# Patient Record
Sex: FEMALE | Race: WHITE | Employment: OTHER | URBAN - METROPOLITAN AREA
[De-identification: names, ages, dates, MRNs, and addresses within clinical notes are randomized per-mention and may not be internally consistent; named-entity substitution may affect disease eponyms.]

---

## 2017-01-18 ENCOUNTER — OFFICE VISIT (OUTPATIENT)
Dept: FAMILY MEDICINE CLINIC | Age: 82
End: 2017-01-18

## 2017-01-18 VITALS
HEART RATE: 64 BPM | SYSTOLIC BLOOD PRESSURE: 112 MMHG | RESPIRATION RATE: 10 BRPM | BODY MASS INDEX: 25.8 KG/M2 | HEIGHT: 64 IN | DIASTOLIC BLOOD PRESSURE: 62 MMHG | WEIGHT: 151.13 LBS

## 2017-01-18 DIAGNOSIS — M19.90 OSTEOARTHRITIS, UNSPECIFIED OSTEOARTHRITIS TYPE, UNSPECIFIED SITE: ICD-10-CM

## 2017-01-18 DIAGNOSIS — I73.9 PERIPHERAL VASCULAR DISEASE, UNSPECIFIED (HCC): ICD-10-CM

## 2017-01-18 DIAGNOSIS — R60.9 PERIPHERAL EDEMA: ICD-10-CM

## 2017-01-18 DIAGNOSIS — F41.9 ANXIETY: ICD-10-CM

## 2017-01-18 DIAGNOSIS — G62.9 NEUROPATHY: Primary | ICD-10-CM

## 2017-01-18 DIAGNOSIS — R26.81 GAIT INSTABILITY: ICD-10-CM

## 2017-01-18 PROCEDURE — 99213 OFFICE O/P EST LOW 20 MIN: CPT | Performed by: FAMILY MEDICINE

## 2017-01-18 ASSESSMENT — ENCOUNTER SYMPTOMS
CONSTIPATION: 0
EYE PAIN: 0
SORE THROAT: 0
COUGH: 0
WHEEZING: 0
SHORTNESS OF BREATH: 0
NAUSEA: 0
ABDOMINAL PAIN: 0
CHEST TIGHTNESS: 0

## 2017-01-24 LAB
BUN BLDV-MCNC: 14 MG/DL
CALCIUM SERPL-MCNC: 8.8 MG/DL
CHLORIDE BLD-SCNC: 103 MMOL/L
CO2: 26 MMOL/L
CREAT SERPL-MCNC: 1.07 MG/DL
GFR CALCULATED: 44
GLUCOSE BLD-MCNC: 92 MG/DL
POTASSIUM SERPL-SCNC: 3.9 MMOL/L
SODIUM BLD-SCNC: 142 MMOL/L

## 2017-01-25 ENCOUNTER — OFFICE VISIT (OUTPATIENT)
Dept: NEPHROLOGY | Age: 82
End: 2017-01-25

## 2017-01-25 VITALS
WEIGHT: 153 LBS | BODY MASS INDEX: 26.26 KG/M2 | OXYGEN SATURATION: 98 % | HEART RATE: 83 BPM | SYSTOLIC BLOOD PRESSURE: 120 MMHG | DIASTOLIC BLOOD PRESSURE: 76 MMHG

## 2017-01-25 DIAGNOSIS — R60.9 PERIPHERAL EDEMA: ICD-10-CM

## 2017-01-25 DIAGNOSIS — N18.30 CKD (CHRONIC KIDNEY DISEASE), STAGE III (HCC): Primary | ICD-10-CM

## 2017-01-25 PROCEDURE — G8427 DOCREV CUR MEDS BY ELIG CLIN: HCPCS | Performed by: INTERNAL MEDICINE

## 2017-01-25 PROCEDURE — G8484 FLU IMMUNIZE NO ADMIN: HCPCS | Performed by: INTERNAL MEDICINE

## 2017-01-25 PROCEDURE — 1090F PRES/ABSN URINE INCON ASSESS: CPT | Performed by: INTERNAL MEDICINE

## 2017-01-25 PROCEDURE — 1123F ACP DISCUSS/DSCN MKR DOCD: CPT | Performed by: INTERNAL MEDICINE

## 2017-01-25 PROCEDURE — 4040F PNEUMOC VAC/ADMIN/RCVD: CPT | Performed by: INTERNAL MEDICINE

## 2017-01-25 PROCEDURE — 99213 OFFICE O/P EST LOW 20 MIN: CPT | Performed by: INTERNAL MEDICINE

## 2017-01-25 PROCEDURE — G8420 CALC BMI NORM PARAMETERS: HCPCS | Performed by: INTERNAL MEDICINE

## 2017-01-25 PROCEDURE — 1036F TOBACCO NON-USER: CPT | Performed by: INTERNAL MEDICINE

## 2017-02-13 ENCOUNTER — TELEPHONE (OUTPATIENT)
Dept: FAMILY MEDICINE CLINIC | Age: 82
End: 2017-02-13

## 2017-04-25 ENCOUNTER — OFFICE VISIT (OUTPATIENT)
Dept: FAMILY MEDICINE CLINIC | Age: 82
End: 2017-04-25

## 2017-04-25 VITALS
HEIGHT: 64 IN | RESPIRATION RATE: 14 BRPM | SYSTOLIC BLOOD PRESSURE: 116 MMHG | DIASTOLIC BLOOD PRESSURE: 64 MMHG | WEIGHT: 154.13 LBS | BODY MASS INDEX: 26.31 KG/M2 | HEART RATE: 72 BPM

## 2017-04-25 DIAGNOSIS — M19.90 OSTEOARTHRITIS, UNSPECIFIED OSTEOARTHRITIS TYPE, UNSPECIFIED SITE: Primary | ICD-10-CM

## 2017-04-25 DIAGNOSIS — K59.01 SLOW TRANSIT CONSTIPATION: ICD-10-CM

## 2017-04-25 DIAGNOSIS — E03.9 HYPOTHYROIDISM (ACQUIRED): ICD-10-CM

## 2017-04-25 DIAGNOSIS — G62.9 NEUROPATHY: ICD-10-CM

## 2017-04-25 DIAGNOSIS — I73.9 PERIPHERAL VASCULAR DISEASE (HCC): ICD-10-CM

## 2017-04-25 DIAGNOSIS — K43.9 VENTRAL HERNIA WITHOUT OBSTRUCTION OR GANGRENE: ICD-10-CM

## 2017-04-25 DIAGNOSIS — I87.8 VENOUS STASIS OF LOWER EXTREMITY: ICD-10-CM

## 2017-04-25 DIAGNOSIS — K21.9 GASTROESOPHAGEAL REFLUX DISEASE, ESOPHAGITIS PRESENCE NOT SPECIFIED: ICD-10-CM

## 2017-04-25 PROCEDURE — 99214 OFFICE O/P EST MOD 30 MIN: CPT | Performed by: FAMILY MEDICINE

## 2017-04-25 RX ORDER — ACETAMINOPHEN 325 MG/1
650 TABLET ORAL EVERY 6 HOURS PRN
COMMUNITY

## 2017-04-25 ASSESSMENT — ENCOUNTER SYMPTOMS
SHORTNESS OF BREATH: 0
WHEEZING: 0
HEARTBURN: 0
COUGH: 0
CONSTIPATION: 0
HOARSE VOICE: 0
ABDOMINAL PAIN: 0
NAUSEA: 0
SORE THROAT: 1
EYE PAIN: 0
CHEST TIGHTNESS: 0

## 2017-05-26 ENCOUNTER — TELEPHONE (OUTPATIENT)
Dept: FAMILY MEDICINE CLINIC | Age: 82
End: 2017-05-26

## 2017-06-09 ENCOUNTER — OFFICE VISIT (OUTPATIENT)
Dept: FAMILY MEDICINE CLINIC | Age: 82
End: 2017-06-09

## 2017-06-09 VITALS
RESPIRATION RATE: 14 BRPM | WEIGHT: 155.25 LBS | HEART RATE: 72 BPM | BODY MASS INDEX: 26.5 KG/M2 | SYSTOLIC BLOOD PRESSURE: 126 MMHG | HEIGHT: 64 IN | DIASTOLIC BLOOD PRESSURE: 70 MMHG

## 2017-06-09 DIAGNOSIS — M54.50 ACUTE MIDLINE LOW BACK PAIN WITHOUT SCIATICA: Primary | ICD-10-CM

## 2017-06-09 DIAGNOSIS — I87.8 VENOUS STASIS OF LOWER EXTREMITY: ICD-10-CM

## 2017-06-09 DIAGNOSIS — M25.552 LEFT HIP PAIN: ICD-10-CM

## 2017-06-09 PROCEDURE — 99213 OFFICE O/P EST LOW 20 MIN: CPT | Performed by: FAMILY MEDICINE

## 2017-06-09 ASSESSMENT — ENCOUNTER SYMPTOMS
CHEST TIGHTNESS: 0
SHORTNESS OF BREATH: 0
NAUSEA: 0
ABDOMINAL PAIN: 0
WHEEZING: 0
CONSTIPATION: 0
SORE THROAT: 0
EYE PAIN: 0
COUGH: 0

## 2017-06-09 ASSESSMENT — PATIENT HEALTH QUESTIONNAIRE - PHQ9
1. LITTLE INTEREST OR PLEASURE IN DOING THINGS: 0
SUM OF ALL RESPONSES TO PHQ QUESTIONS 1-9: 0
2. FEELING DOWN, DEPRESSED OR HOPELESS: 0
SUM OF ALL RESPONSES TO PHQ9 QUESTIONS 1 & 2: 0

## 2017-06-12 ENCOUNTER — TELEPHONE (OUTPATIENT)
Dept: FAMILY MEDICINE CLINIC | Age: 82
End: 2017-06-12

## 2017-06-29 ENCOUNTER — OFFICE VISIT (OUTPATIENT)
Dept: AUDIOLOGY | Age: 82
End: 2017-06-29

## 2017-06-29 DIAGNOSIS — H90.3 SENSORINEURAL HEARING LOSS, BILATERAL: Primary | ICD-10-CM

## 2017-07-11 ENCOUNTER — TELEPHONE (OUTPATIENT)
Dept: AUDIOLOGY | Age: 82
End: 2017-07-11

## 2017-07-24 LAB
BUN BLDV-MCNC: 18 MG/DL
CALCIUM SERPL-MCNC: 8.5 MG/DL
CHLORIDE BLD-SCNC: 104 MMOL/L
CO2: 28 MMOL/L
CREAT SERPL-MCNC: 0.9 MG/DL
GFR CALCULATED: 58
GLUCOSE BLD-MCNC: 90 MG/DL
POTASSIUM SERPL-SCNC: 3.9 MMOL/L
SODIUM BLD-SCNC: 140 MMOL/L

## 2017-07-26 ENCOUNTER — OFFICE VISIT (OUTPATIENT)
Dept: NEPHROLOGY | Age: 82
End: 2017-07-26
Payer: MEDICARE

## 2017-07-26 VITALS
DIASTOLIC BLOOD PRESSURE: 78 MMHG | HEART RATE: 74 BPM | BODY MASS INDEX: 26.69 KG/M2 | SYSTOLIC BLOOD PRESSURE: 128 MMHG | OXYGEN SATURATION: 96 % | WEIGHT: 155.5 LBS

## 2017-07-26 DIAGNOSIS — N18.30 CKD (CHRONIC KIDNEY DISEASE), STAGE III (HCC): ICD-10-CM

## 2017-07-26 DIAGNOSIS — R60.9 PERIPHERAL EDEMA: Primary | ICD-10-CM

## 2017-07-26 PROCEDURE — 4040F PNEUMOC VAC/ADMIN/RCVD: CPT | Performed by: INTERNAL MEDICINE

## 2017-07-26 PROCEDURE — G8427 DOCREV CUR MEDS BY ELIG CLIN: HCPCS | Performed by: INTERNAL MEDICINE

## 2017-07-26 PROCEDURE — 1036F TOBACCO NON-USER: CPT | Performed by: INTERNAL MEDICINE

## 2017-07-26 PROCEDURE — 1090F PRES/ABSN URINE INCON ASSESS: CPT | Performed by: INTERNAL MEDICINE

## 2017-07-26 PROCEDURE — 1123F ACP DISCUSS/DSCN MKR DOCD: CPT | Performed by: INTERNAL MEDICINE

## 2017-07-26 PROCEDURE — 99213 OFFICE O/P EST LOW 20 MIN: CPT | Performed by: INTERNAL MEDICINE

## 2017-07-26 PROCEDURE — G8419 CALC BMI OUT NRM PARAM NOF/U: HCPCS | Performed by: INTERNAL MEDICINE

## 2017-08-08 ENCOUNTER — TELEPHONE (OUTPATIENT)
Dept: FAMILY MEDICINE CLINIC | Age: 82
End: 2017-08-08

## 2017-08-08 DIAGNOSIS — M79.671 FOOT PAIN, BILATERAL: Primary | ICD-10-CM

## 2017-08-08 DIAGNOSIS — M79.672 FOOT PAIN, BILATERAL: Primary | ICD-10-CM

## 2017-08-22 ENCOUNTER — TELEPHONE (OUTPATIENT)
Dept: FAMILY MEDICINE CLINIC | Age: 82
End: 2017-08-22

## 2017-08-24 DIAGNOSIS — R60.9 PERIPHERAL EDEMA: ICD-10-CM

## 2017-08-24 RX ORDER — BUMETANIDE 1 MG/1
TABLET ORAL
Qty: 60 TABLET | Refills: 3 | Status: CANCELLED | OUTPATIENT
Start: 2017-08-24

## 2017-08-25 RX ORDER — BUMETANIDE 1 MG/1
1 TABLET ORAL 2 TIMES DAILY
Qty: 60 TABLET | Refills: 3 | Status: SHIPPED | OUTPATIENT
Start: 2017-08-25 | End: 2017-12-08 | Stop reason: SDUPTHER

## 2017-09-19 RX ORDER — PANTOPRAZOLE SODIUM 40 MG/1
TABLET, DELAYED RELEASE ORAL
Qty: 30 TABLET | Refills: 5 | Status: SHIPPED | OUTPATIENT
Start: 2017-09-19

## 2017-09-19 RX ORDER — POTASSIUM CHLORIDE 750 MG/1
TABLET, FILM COATED, EXTENDED RELEASE ORAL
Qty: 60 TABLET | Refills: 5 | Status: SHIPPED | OUTPATIENT
Start: 2017-09-19 | End: 2018-01-31 | Stop reason: HOSPADM

## 2017-09-19 RX ORDER — DOCUSATE SODIUM 100 MG/1
CAPSULE, LIQUID FILLED ORAL
Qty: 60 CAPSULE | Refills: 5 | Status: SHIPPED | OUTPATIENT
Start: 2017-09-19 | End: 2018-01-31 | Stop reason: HOSPADM

## 2017-09-20 DIAGNOSIS — G62.9 NEUROPATHY: ICD-10-CM

## 2017-09-21 RX ORDER — GABAPENTIN 100 MG/1
CAPSULE ORAL
Qty: 60 CAPSULE | Refills: 5 | Status: SHIPPED | OUTPATIENT
Start: 2017-09-21

## 2017-11-27 ENCOUNTER — APPOINTMENT (OUTPATIENT)
Dept: GENERAL RADIOLOGY | Age: 82
End: 2017-11-27
Payer: MEDICARE

## 2017-11-27 ENCOUNTER — CARE COORDINATOR VISIT (OUTPATIENT)
Dept: CASE MANAGEMENT | Age: 82
End: 2017-11-27

## 2017-11-27 ENCOUNTER — HOSPITAL ENCOUNTER (EMERGENCY)
Age: 82
Discharge: HOME | End: 2017-11-27
Payer: MEDICARE

## 2017-11-27 VITALS
DIASTOLIC BLOOD PRESSURE: 58 MMHG | WEIGHT: 150 LBS | TEMPERATURE: 98 F | RESPIRATION RATE: 18 BRPM | BODY MASS INDEX: 25.75 KG/M2 | SYSTOLIC BLOOD PRESSURE: 116 MMHG | HEART RATE: 68 BPM | OXYGEN SATURATION: 98 %

## 2017-11-27 DIAGNOSIS — M41.9 SCOLIOSIS, UNSPECIFIED SCOLIOSIS TYPE, UNSPECIFIED SPINAL REGION: Primary | ICD-10-CM

## 2017-11-27 DIAGNOSIS — M54.6 ACUTE MIDLINE THORACIC BACK PAIN: ICD-10-CM

## 2017-11-27 DIAGNOSIS — R14.0 GASSINESS: ICD-10-CM

## 2017-11-27 LAB
ALBUMIN SERPL-MCNC: 3.2 G/DL (ref 3.5–5.1)
ALP BLD-CCNC: 130 U/L (ref 38–126)
ALT SERPL-CCNC: 10 U/L (ref 11–66)
ANION GAP SERPL CALCULATED.3IONS-SCNC: 11 MEQ/L (ref 8–16)
ANISOCYTOSIS: ABNORMAL
AST SERPL-CCNC: 16 U/L (ref 5–40)
BACTERIA: NORMAL /HPF
BASOPHILS # BLD: 0.1 %
BASOPHILS ABSOLUTE: 0 THOU/MM3 (ref 0–0.1)
BILIRUB SERPL-MCNC: 0.9 MG/DL (ref 0.3–1.2)
BILIRUBIN DIRECT: < 0.2 MG/DL (ref 0–0.3)
BILIRUBIN URINE: NEGATIVE
BLOOD, URINE: NEGATIVE
BUN BLDV-MCNC: 15 MG/DL (ref 7–22)
CALCIUM SERPL-MCNC: 8.1 MG/DL (ref 8.5–10.5)
CASTS 2: NORMAL /LPF
CASTS UA: NORMAL /LPF
CHARACTER, URINE: CLEAR
CHLORIDE BLD-SCNC: 103 MEQ/L (ref 98–111)
CO2: 28 MEQ/L (ref 23–33)
COLOR: YELLOW
CREAT SERPL-MCNC: 0.9 MG/DL (ref 0.4–1.2)
CRYSTALS, UA: NORMAL
EKG ATRIAL RATE: 59 BPM
EKG P AXIS: 67 DEGREES
EKG P-R INTERVAL: 196 MS
EKG Q-T INTERVAL: 418 MS
EKG QRS DURATION: 100 MS
EKG QTC CALCULATION (BAZETT): 413 MS
EKG R AXIS: -34 DEGREES
EKG T AXIS: 23 DEGREES
EKG VENTRICULAR RATE: 59 BPM
EOSINOPHIL # BLD: 0.6 %
EOSINOPHILS ABSOLUTE: 0 THOU/MM3 (ref 0–0.4)
EPITHELIAL CELLS, UA: NORMAL /HPF
GFR SERPL CREATININE-BSD FRML MDRD: 58 ML/MIN/1.73M2
GLUCOSE BLD-MCNC: 101 MG/DL (ref 70–108)
GLUCOSE URINE: NEGATIVE MG/DL
HCT VFR BLD CALC: 31.9 % (ref 37–47)
HEMOGLOBIN: 10.5 GM/DL (ref 12–16)
KETONES, URINE: NEGATIVE
LEUKOCYTE ESTERASE, URINE: NEGATIVE
LIPASE: 23.7 U/L (ref 5.6–51.3)
LYMPHOCYTES # BLD: 11.9 %
LYMPHOCYTES ABSOLUTE: 0.7 THOU/MM3 (ref 1–4.8)
MCH RBC QN AUTO: 31.3 PG (ref 27–31)
MCHC RBC AUTO-ENTMCNC: 32.9 GM/DL (ref 33–37)
MCV RBC AUTO: 95 FL (ref 81–99)
MISCELLANEOUS 2: NORMAL
MONOCYTES # BLD: 5.3 %
MONOCYTES ABSOLUTE: 0.3 THOU/MM3 (ref 0.4–1.3)
NITRITE, URINE: NEGATIVE
NUCLEATED RED BLOOD CELLS: 0 /100 WBC
OSMOLALITY CALCULATION: 284.1 MOSMOL/KG (ref 275–300)
PDW BLD-RTO: 15.6 % (ref 11.5–14.5)
PH UA: 6.5
PLATELET # BLD: 252 THOU/MM3 (ref 130–400)
PMV BLD AUTO: 9 MCM (ref 7.4–10.4)
POTASSIUM SERPL-SCNC: 3.5 MEQ/L (ref 3.5–5.2)
PRO-BNP: 80.1 PG/ML (ref 0–1800)
PROTEIN UA: NEGATIVE
RBC # BLD: 3.36 MILL/MM3 (ref 4.2–5.4)
RBC URINE: NORMAL /HPF
RENAL EPITHELIAL, UA: NORMAL
SEG NEUTROPHILS: 82.1 %
SEGMENTED NEUTROPHILS ABSOLUTE COUNT: 4.5 THOU/MM3 (ref 1.8–7.7)
SODIUM BLD-SCNC: 142 MEQ/L (ref 135–145)
SPECIFIC GRAVITY, URINE: 1.01 (ref 1–1.03)
TOTAL PROTEIN: 7.4 G/DL (ref 6.1–8)
TROPONIN T: < 0.01 NG/ML
UROBILINOGEN, URINE: 0.2 EU/DL
WBC # BLD: 5.5 THOU/MM3 (ref 4.8–10.8)
WBC UA: NORMAL /HPF
YEAST: NORMAL

## 2017-11-27 PROCEDURE — 80053 COMPREHEN METABOLIC PANEL: CPT

## 2017-11-27 PROCEDURE — 93005 ELECTROCARDIOGRAM TRACING: CPT

## 2017-11-27 PROCEDURE — 81001 URINALYSIS AUTO W/SCOPE: CPT

## 2017-11-27 PROCEDURE — 72072 X-RAY EXAM THORAC SPINE 3VWS: CPT

## 2017-11-27 PROCEDURE — 83880 ASSAY OF NATRIURETIC PEPTIDE: CPT

## 2017-11-27 PROCEDURE — 83690 ASSAY OF LIPASE: CPT

## 2017-11-27 PROCEDURE — 71020 XR CHEST STANDARD TWO VW: CPT

## 2017-11-27 PROCEDURE — 2580000003 HC RX 258: Performed by: PHYSICIAN ASSISTANT

## 2017-11-27 PROCEDURE — 99284 EMERGENCY DEPT VISIT MOD MDM: CPT

## 2017-11-27 PROCEDURE — 36415 COLL VENOUS BLD VENIPUNCTURE: CPT

## 2017-11-27 PROCEDURE — 6370000000 HC RX 637 (ALT 250 FOR IP): Performed by: PHYSICIAN ASSISTANT

## 2017-11-27 PROCEDURE — 84484 ASSAY OF TROPONIN QUANT: CPT

## 2017-11-27 PROCEDURE — 82248 BILIRUBIN DIRECT: CPT

## 2017-11-27 PROCEDURE — 85025 COMPLETE CBC W/AUTO DIFF WBC: CPT

## 2017-11-27 RX ORDER — LIDOCAINE 50 MG/G
1 PATCH TOPICAL DAILY
Qty: 15 PATCH | Refills: 0 | Status: SHIPPED | OUTPATIENT
Start: 2017-11-27 | End: 2019-01-01

## 2017-11-27 RX ORDER — SODIUM CHLORIDE 9 MG/ML
INJECTION, SOLUTION INTRAVENOUS CONTINUOUS
Status: DISCONTINUED | OUTPATIENT
Start: 2017-11-27 | End: 2017-11-27 | Stop reason: HOSPADM

## 2017-11-27 RX ORDER — LIDOCAINE 50 MG/G
1 PATCH TOPICAL ONCE
Status: DISCONTINUED | OUTPATIENT
Start: 2017-11-27 | End: 2017-11-27

## 2017-11-27 RX ORDER — LIDOCAINE 50 MG/G
1 PATCH TOPICAL ONCE
Status: DISCONTINUED | OUTPATIENT
Start: 2017-11-27 | End: 2017-11-27 | Stop reason: HOSPADM

## 2017-11-27 RX ADMIN — SODIUM CHLORIDE: 9 INJECTION, SOLUTION INTRAVENOUS at 10:41

## 2017-11-27 ASSESSMENT — ENCOUNTER SYMPTOMS
VOMITING: 0
EYE PAIN: 0
ABDOMINAL PAIN: 0
SHORTNESS OF BREATH: 0
BACK PAIN: 1
SORE THROAT: 0
COUGH: 0
EYE DISCHARGE: 0
RHINORRHEA: 0
DIARRHEA: 0
WHEEZING: 0
NAUSEA: 0

## 2017-11-27 ASSESSMENT — PAIN DESCRIPTION - LOCATION: LOCATION: BACK

## 2017-11-27 ASSESSMENT — PAIN SCALES - GENERAL: PAINLEVEL_OUTOF10: 4

## 2017-11-27 ASSESSMENT — PAIN DESCRIPTION - ORIENTATION: ORIENTATION: UPPER

## 2017-11-27 NOTE — ED PROVIDER NOTES
128/61 108/71 128/60 (!) 116/58   Pulse: 79 76 72 68   Resp: 18 15 18 18   Temp: 98 °F (36.7 °C)      TempSrc: Oral      SpO2: 96% 95% 95% 98%   Weight: 150 lb (68 kg)          10:19 AM: The patient was seen and evaluated. The patient presented via EMS today from home with complaints of chronic lumbar back pain and a new onset of thoracic back pain. There was no trauma/injury/fall. The patient additionally complained of GERD with increased belching. Appropriate labs and imaging were ordered and reviewed upon completion. XR of chest showed chronic changes, but no acute findings. XR of the thoracic spine showed scoliosis and and chronic degenerative changes. Troponin was normal. The patient was tretaed with Lidoderm for pain control in the duration of her stay. She is feeling much better. She was seen by social work in the department. They did discuss the case with her passport worker and the requested assisted living units that she is wanting to get into cannot accept the patient anymore today. She and daughter do feel comfortable with her going home and contacting the morning about availability for assisted living placement. She will be sent home with this as well along with plan to call her PCP in 1 day to schedule follow-up If unable to arrange assisted living. Reasons for which return discussed. CRITICAL CARE:   None      CONSULTS:  None     PROCEDURES:  None    FINAL IMPRESSION      1. Scoliosis, unspecified scoliosis type, unspecified spinal region    2. Acute midline thoracic back pain    3.  Gassiness          DISPOSITION/PLAN   Discharge     PATIENT REFERRED TO:  Javier Das MD  [address] [place] [address]  [phone]    Call in 1 day  to seek additional help if Sangia Dennis with [employer] is not able to help facilitate assisted living placement    [facility] EMERGENCY DEPT  [address] [address]  [phone]    If symptoms worsen      DISCHARGE MEDICATIONS:  Discharge
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
## 2017-11-27 NOTE — ED TRIAGE NOTES
Patient presents to the ED with complaints of back pain, constipation and some nasal congestion per squad. Patient states that she hurts in the middle of her back around her spine, which she states is a chronic problem. Patient states that she has also been struggling with constipation intermittently although she had \"a good BM yesterday\". Patient states that she has been belching frequently as well. Psatient does not complauin about nasal congestion. Patient is alert and oriented. Patient shows no signs of distress at this time.  Will continue to [facility]

## 2017-11-27 NOTE — ED NOTES
Bed: 038A  Expected date: 11/27/17  Expected time:   Means of arrival:   Comments:  Congestion/LACP     Pleasant Ingris  11/27/17 9686

## 2017-11-28 ENCOUNTER — CARE COORDINATION (OUTPATIENT)
Dept: CASE MANAGEMENT | Age: 82
End: 2017-11-28

## 2017-11-28 NOTE — CARE COORDINATION
Contacted [employer] [address] [place] [address]. Diogo Barry is in contact with patient and jamar Calderon, working on SNF/AL placement.

## 2017-12-08 DIAGNOSIS — R60.9 PERIPHERAL EDEMA: ICD-10-CM

## 2017-12-08 NOTE — TELEPHONE ENCOUNTER
Date of last visit:  6/9/2017  Date of next visit:  12/13/2017    Requested Prescriptions     Pending Prescriptions Disp Refills    bumetanide (BUMEX) 1 MG tablet [Pharmacy Med Name: BUMETANIDE 1 MG TABLET] 60 tablet 3     Sig: take 1 tablet by mouth twice a day

## 2017-12-09 RX ORDER — BUMETANIDE 1 MG/1
TABLET ORAL
Qty: 60 TABLET | Refills: 3 | Status: SHIPPED | OUTPATIENT
Start: 2017-12-09

## 2018-01-25 ENCOUNTER — TELEPHONE (OUTPATIENT)
Dept: FAMILY MEDICINE CLINIC | Age: 83
End: 2018-01-25

## 2018-01-25 RX ORDER — METOLAZONE 2.5 MG/1
2.5 TABLET ORAL DAILY
Qty: 5 TABLET | Refills: 0 | Status: SHIPPED | OUTPATIENT
Start: 2018-01-25 | End: 2018-01-31 | Stop reason: HOSPADM

## 2018-01-25 NOTE — TELEPHONE ENCOUNTER
Date of last visit:  Visit date not found  Date of next visit:  Visit date not found    Requested Prescriptions     Signed Prescriptions Disp Refills    metolazone (ZAROXOLYN) 2.5 MG tablet 5 tablet 0     Sig: Take 1 tablet by mouth daily for 5 days     Authorizing Provider: Rudi Atkins     Ordering User: Ana ESPINOZA [address] [place] [address]) informed- order written and faxed to their office for their records.

## 2018-01-25 NOTE — TELEPHONE ENCOUNTER
[facility] called and said that the pt has edema and is having the shiny skin. She is worried about cellulitis. They have been doing the miguelina hose on one leg and an ace wrap on the other. [facility] would like to know if they can incorporate lasix. She said that they are unable to get her here at this time. Please call [facility] to inform.  [phone]    Please send to :    [facility]

## 2018-01-27 ENCOUNTER — HOSPITAL ENCOUNTER (OUTPATIENT)
Age: 83
Setting detail: OBSERVATION
Discharge: NURSING, REHAB OR LONG-TERM CARE | End: 2018-01-31
Attending: FAMILY MEDICINE | Admitting: EMERGENCY MEDICINE
Payer: MEDICARE

## 2018-01-27 ENCOUNTER — APPOINTMENT (OUTPATIENT)
Dept: GENERAL RADIOLOGY | Age: 83
End: 2018-01-27
Payer: MEDICARE

## 2018-01-27 DIAGNOSIS — Z74.09 IMPAIRED MOBILITY AND ADLS: ICD-10-CM

## 2018-01-27 DIAGNOSIS — R60.9 PERIPHERAL EDEMA: Primary | ICD-10-CM

## 2018-01-27 DIAGNOSIS — Z78.9 IMPAIRED MOBILITY AND ADLS: ICD-10-CM

## 2018-01-27 DIAGNOSIS — F41.1 ANXIETY STATE: ICD-10-CM

## 2018-01-27 LAB
ANION GAP SERPL CALCULATED.3IONS-SCNC: 14 MEQ/L (ref 8–16)
ANISOCYTOSIS: ABNORMAL
BASOPHILS # BLD: 0.5 %
BASOPHILS ABSOLUTE: 0 THOU/MM3 (ref 0–0.1)
BUN BLDV-MCNC: 13 MG/DL (ref 7–22)
CALCIUM SERPL-MCNC: 8.8 MG/DL (ref 8.5–10.5)
CHLORIDE BLD-SCNC: 97 MEQ/L (ref 98–111)
CO2: 29 MEQ/L (ref 23–33)
CREAT SERPL-MCNC: 1.1 MG/DL (ref 0.4–1.2)
EKG ATRIAL RATE: 79 BPM
EKG P AXIS: 52 DEGREES
EKG P-R INTERVAL: 172 MS
EKG Q-T INTERVAL: 396 MS
EKG QRS DURATION: 100 MS
EKG QTC CALCULATION (BAZETT): 454 MS
EKG R AXIS: -35 DEGREES
EKG T AXIS: 60 DEGREES
EKG VENTRICULAR RATE: 79 BPM
EOSINOPHIL # BLD: 1.3 %
EOSINOPHILS ABSOLUTE: 0.1 THOU/MM3 (ref 0–0.4)
GFR SERPL CREATININE-BSD FRML MDRD: 46 ML/MIN/1.73M2
GLUCOSE BLD-MCNC: 106 MG/DL (ref 70–108)
HCT VFR BLD CALC: 34.5 % (ref 37–47)
HEMOGLOBIN: 11.2 GM/DL (ref 12–16)
LYMPHOCYTES # BLD: 20.2 %
LYMPHOCYTES ABSOLUTE: 1 THOU/MM3 (ref 1–4.8)
MCH RBC QN AUTO: 31.1 PG (ref 27–31)
MCHC RBC AUTO-ENTMCNC: 32.5 GM/DL (ref 33–37)
MCV RBC AUTO: 95.6 FL (ref 81–99)
MONOCYTES # BLD: 10.3 %
MONOCYTES ABSOLUTE: 0.5 THOU/MM3 (ref 0.4–1.3)
NUCLEATED RED BLOOD CELLS: 0 /100 WBC
OSMOLALITY CALCULATION: 279.9 MOSMOL/KG (ref 275–300)
PDW BLD-RTO: 15.7 % (ref 11.5–14.5)
PLATELET # BLD: 263 THOU/MM3 (ref 130–400)
PMV BLD AUTO: 8.9 MCM (ref 7.4–10.4)
POTASSIUM SERPL-SCNC: 3.5 MEQ/L (ref 3.5–5.2)
PRO-BNP: 89.2 PG/ML (ref 0–1800)
RBC # BLD: 3.61 MILL/MM3 (ref 4.2–5.4)
SEG NEUTROPHILS: 67.7 %
SEGMENTED NEUTROPHILS ABSOLUTE COUNT: 3.2 THOU/MM3 (ref 1.8–7.7)
SODIUM BLD-SCNC: 140 MEQ/L (ref 135–145)
TROPONIN T: < 0.01 NG/ML
WBC # BLD: 4.8 THOU/MM3 (ref 4.8–10.8)

## 2018-01-27 PROCEDURE — 85025 COMPLETE CBC W/AUTO DIFF WBC: CPT

## 2018-01-27 PROCEDURE — 6370000000 HC RX 637 (ALT 250 FOR IP): Performed by: FAMILY MEDICINE

## 2018-01-27 PROCEDURE — 99285 EMERGENCY DEPT VISIT HI MDM: CPT

## 2018-01-27 PROCEDURE — 93005 ELECTROCARDIOGRAM TRACING: CPT

## 2018-01-27 PROCEDURE — 83880 ASSAY OF NATRIURETIC PEPTIDE: CPT

## 2018-01-27 PROCEDURE — 84484 ASSAY OF TROPONIN QUANT: CPT

## 2018-01-27 PROCEDURE — 36415 COLL VENOUS BLD VENIPUNCTURE: CPT

## 2018-01-27 PROCEDURE — 71046 X-RAY EXAM CHEST 2 VIEWS: CPT

## 2018-01-27 PROCEDURE — 80048 BASIC METABOLIC PNL TOTAL CA: CPT

## 2018-01-27 RX ORDER — GABAPENTIN 100 MG/1
100 CAPSULE ORAL ONCE
Status: COMPLETED | OUTPATIENT
Start: 2018-01-27 | End: 2018-01-27

## 2018-01-27 RX ADMIN — GABAPENTIN 100 MG: 100 CAPSULE ORAL at 22:49

## 2018-01-27 ASSESSMENT — ENCOUNTER SYMPTOMS
SORE THROAT: 0
DIARRHEA: 0
COUGH: 0
BACK PAIN: 0
VOMITING: 0
SHORTNESS OF BREATH: 0
RHINORRHEA: 0
EYE PAIN: 0
ABDOMINAL PAIN: 0
NAUSEA: 0
EYE DISCHARGE: 0
WHEEZING: 0

## 2018-01-28 PROBLEM — H90.3 SENSORINEURAL HEARING LOSS (SNHL) OF BOTH EARS: Status: ACTIVE | Noted: 2018-01-28

## 2018-01-28 PROBLEM — R53.1 WEAKNESS GENERALIZED: Status: ACTIVE | Noted: 2018-01-28

## 2018-01-28 PROBLEM — Z78.9 IMPAIRED MOBILITY AND ADLS: Status: ACTIVE | Noted: 2018-01-28

## 2018-01-28 PROBLEM — R60.9 PERIPHERAL EDEMA: Status: ACTIVE | Noted: 2018-01-28

## 2018-01-28 PROBLEM — E87.6 HYPOKALEMIA: Status: ACTIVE | Noted: 2018-01-28

## 2018-01-28 PROBLEM — Z74.09 IMPAIRED FUNCTIONAL MOBILITY, BALANCE, GAIT, AND ENDURANCE: Status: ACTIVE | Noted: 2018-01-28

## 2018-01-28 LAB
ANION GAP SERPL CALCULATED.3IONS-SCNC: 11 MEQ/L (ref 8–16)
BACTERIA: ABNORMAL /HPF
BILIRUBIN URINE: NEGATIVE
BLOOD, URINE: NEGATIVE
BUN BLDV-MCNC: 12 MG/DL (ref 7–22)
CALCIUM SERPL-MCNC: 8.5 MG/DL (ref 8.5–10.5)
CASTS 2: ABNORMAL /LPF
CASTS UA: ABNORMAL /LPF
CHARACTER, URINE: ABNORMAL
CHLORIDE BLD-SCNC: 99 MEQ/L (ref 98–111)
CO2: 30 MEQ/L (ref 23–33)
COLOR: YELLOW
CREAT SERPL-MCNC: 0.9 MG/DL (ref 0.4–1.2)
CRYSTALS, UA: ABNORMAL
EPITHELIAL CELLS, UA: ABNORMAL /HPF
GFR SERPL CREATININE-BSD FRML MDRD: 58 ML/MIN/1.73M2
GLUCOSE BLD-MCNC: 96 MG/DL (ref 70–108)
GLUCOSE URINE: NEGATIVE MG/DL
KETONES, URINE: NEGATIVE
LEUKOCYTE ESTERASE, URINE: NEGATIVE
MAGNESIUM: 2.2 MG/DL (ref 1.6–2.4)
MISCELLANEOUS 2: ABNORMAL
NITRITE, URINE: NEGATIVE
PH UA: 7
POTASSIUM SERPL-SCNC: 3.2 MEQ/L (ref 3.5–5.2)
PRO-BNP: 85.7 PG/ML (ref 0–1800)
PROTEIN UA: NEGATIVE
RBC URINE: ABNORMAL /HPF
RENAL EPITHELIAL, UA: ABNORMAL
SODIUM BLD-SCNC: 140 MEQ/L (ref 135–145)
SPECIFIC GRAVITY, URINE: 1.01 (ref 1–1.03)
TSH SERPL DL<=0.05 MIU/L-ACNC: 2.78 UIU/ML (ref 0.4–4.2)
UROBILINOGEN, URINE: 0.2 EU/DL
WBC UA: ABNORMAL /HPF
YEAST: ABNORMAL

## 2018-01-28 PROCEDURE — 6370000000 HC RX 637 (ALT 250 FOR IP): Performed by: EMERGENCY MEDICINE

## 2018-01-28 PROCEDURE — 84443 ASSAY THYROID STIM HORMONE: CPT

## 2018-01-28 PROCEDURE — G0378 HOSPITAL OBSERVATION PER HR: HCPCS

## 2018-01-28 PROCEDURE — G8979 MOBILITY GOAL STATUS: HCPCS

## 2018-01-28 PROCEDURE — G8978 MOBILITY CURRENT STATUS: HCPCS

## 2018-01-28 PROCEDURE — 97162 PT EVAL MOD COMPLEX 30 MIN: CPT

## 2018-01-28 PROCEDURE — 97116 GAIT TRAINING THERAPY: CPT

## 2018-01-28 PROCEDURE — 80048 BASIC METABOLIC PNL TOTAL CA: CPT

## 2018-01-28 PROCEDURE — 83735 ASSAY OF MAGNESIUM: CPT

## 2018-01-28 PROCEDURE — 81001 URINALYSIS AUTO W/SCOPE: CPT

## 2018-01-28 PROCEDURE — 6360000002 HC RX W HCPCS: Performed by: EMERGENCY MEDICINE

## 2018-01-28 PROCEDURE — 83880 ASSAY OF NATRIURETIC PEPTIDE: CPT

## 2018-01-28 PROCEDURE — 36415 COLL VENOUS BLD VENIPUNCTURE: CPT

## 2018-01-28 RX ORDER — POTASSIUM CHLORIDE 750 MG/1
20 TABLET, FILM COATED, EXTENDED RELEASE ORAL EVERY MORNING
Status: DISCONTINUED | OUTPATIENT
Start: 2018-01-28 | End: 2018-01-30

## 2018-01-28 RX ORDER — ACETAMINOPHEN 325 MG/1
650 TABLET ORAL EVERY 4 HOURS PRN
Status: DISCONTINUED | OUTPATIENT
Start: 2018-01-28 | End: 2018-01-31 | Stop reason: HOSPADM

## 2018-01-28 RX ORDER — METOLAZONE 2.5 MG/1
2.5 TABLET ORAL DAILY
Status: DISCONTINUED | OUTPATIENT
Start: 2018-01-28 | End: 2018-01-30

## 2018-01-28 RX ORDER — BUMETANIDE 1 MG/1
1 TABLET ORAL 2 TIMES DAILY
Status: DISCONTINUED | OUTPATIENT
Start: 2018-01-28 | End: 2018-01-31 | Stop reason: HOSPADM

## 2018-01-28 RX ORDER — POLYETHYLENE GLYCOL 3350 17 G/17G
17 POWDER, FOR SOLUTION ORAL DAILY
Status: DISCONTINUED | OUTPATIENT
Start: 2018-01-28 | End: 2018-01-31 | Stop reason: HOSPADM

## 2018-01-28 RX ORDER — DOCUSATE SODIUM 100 MG/1
100 CAPSULE, LIQUID FILLED ORAL 2 TIMES DAILY
Status: DISCONTINUED | OUTPATIENT
Start: 2018-01-28 | End: 2018-01-31 | Stop reason: HOSPADM

## 2018-01-28 RX ORDER — POTASSIUM CHLORIDE 20 MEQ/1
40 TABLET, EXTENDED RELEASE ORAL ONCE
Status: COMPLETED | OUTPATIENT
Start: 2018-01-28 | End: 2018-01-28

## 2018-01-28 RX ORDER — PANTOPRAZOLE SODIUM 40 MG/1
40 TABLET, DELAYED RELEASE ORAL DAILY
Status: DISCONTINUED | OUTPATIENT
Start: 2018-01-28 | End: 2018-01-31 | Stop reason: HOSPADM

## 2018-01-28 RX ORDER — LIDOCAINE 50 MG/G
1 PATCH TOPICAL DAILY
Status: DISCONTINUED | OUTPATIENT
Start: 2018-01-28 | End: 2018-01-31 | Stop reason: HOSPADM

## 2018-01-28 RX ORDER — GABAPENTIN 100 MG/1
100 CAPSULE ORAL 2 TIMES DAILY
Status: DISCONTINUED | OUTPATIENT
Start: 2018-01-28 | End: 2018-01-31 | Stop reason: HOSPADM

## 2018-01-28 RX ADMIN — METOLAZONE 2.5 MG: 2.5 TABLET ORAL at 07:47

## 2018-01-28 RX ADMIN — POLYETHYLENE GLYCOL 3350 17 G: 17 POWDER, FOR SOLUTION ORAL at 07:46

## 2018-01-28 RX ADMIN — GABAPENTIN 100 MG: 100 CAPSULE ORAL at 21:21

## 2018-01-28 RX ADMIN — DOCUSATE SODIUM 100 MG: 100 CAPSULE ORAL at 21:21

## 2018-01-28 RX ADMIN — POTASSIUM CHLORIDE 20 MEQ: 750 TABLET, FILM COATED, EXTENDED RELEASE ORAL at 07:47

## 2018-01-28 RX ADMIN — GABAPENTIN 100 MG: 100 CAPSULE ORAL at 07:46

## 2018-01-28 RX ADMIN — DOCUSATE SODIUM 100 MG: 100 CAPSULE ORAL at 07:46

## 2018-01-28 RX ADMIN — BUMETANIDE 1 MG: 1 TABLET ORAL at 07:46

## 2018-01-28 RX ADMIN — BUMETANIDE 1 MG: 1 TABLET ORAL at 21:21

## 2018-01-28 RX ADMIN — PANTOPRAZOLE SODIUM 40 MG: 40 TABLET, DELAYED RELEASE ORAL at 07:47

## 2018-01-28 RX ADMIN — POTASSIUM CHLORIDE 40 MEQ: 1500 TABLET, EXTENDED RELEASE ORAL at 16:04
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
## 2018-01-28 NOTE — ED PROVIDER NOTES
medicine)    PROCEDURES:  None     FINAL IMPRESSION      1. Peripheral edema    2. Anxiety state    3. Impaired mobility and ADLs          DISPOSITION/PLAN   Decision to admit     PATIENT REFERRED TO:  Tigist De MD  [address] [address] [address] [facility]            DISCHARGE MEDICATIONS:  New Prescriptions    No medications on file       (Please note that portions of this note were completed with a voice recognition program.  Efforts were made to edit the dictations but occasionally words are mis-transcribed.)    Scribe:  Saul Irizarry 1/27/18 9:09 PM Scribing for and in the presence of Cheryle Larose MD.    Signed by: Saul Irizarry (Name), Juanjoseibe, 01/28/18 1:04 AM    Provider:  I personally performed the services described in the documentation, reviewed and edited the documentation which was dictated to the scribe in my presence, and it accurately records my words and actions.     Cheryle Larose MD 1/27/18 1:04 AM                          Cheryle Larose MD  01/28/18 9442

## 2018-01-28 NOTE — ED NOTES
Pt's lower leg wrappings were removed per her request. Pt states she always removes them at nighttime. Pt informed that she was being admitted to the hospital and she voices understanding.      Lynn Cortes RN  01/28/18 0111

## 2018-01-28 NOTE — PROGRESS NOTES
[address] [address]  INPATIENT PHYSICAL THERAPY  EVALUATION  [facility] ORTHOPEDICS 7K    Time In: 8482  Time Out: 2835  Timed Code Treatment Minutes: 9 Minutes  Minutes: 26          Date: 2018  Patient Name: Shawn Modi,  Gender:  female        MRN: [id number]  : 2/3/1921  (80 y.o.)      Referring Practitioner: Dr. Crista Sanuders  Diagnosis: peripheral edema  Additional Pertinent Hx: per ED:  80 y.o. female who presents to the Emergency Department via EMS for the evaluation of anxiety and urinary frequency. The patient states she began a new medication today at approximately 8AM, Metolazone 25mg, for her chronic lower extremity pedal edema in conjunction with Bumex, medication she has been taking for an extended amount of time, where she then developed urinary frequency. She states she has become concerned with her urinary frequency as she has never had this symptom before. She is requesting to stay at the hospital overnight due to her anxiety     Past Medical History:   Diagnosis Date    Anxiety     Chronic kidney disease     Fecal impaction (HCC)     GERD (gastroesophageal reflux disease)     Hernia, ventral     Hiatal hernia     Hypertension     Neuropathy ([address]     peripheral    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    Peripheral edema     Peripheral vascular disease ([address]     Venous stasis of lower extremity      Past Surgical History:   Procedure Laterality Date    APPENDECTOMY      CHOLECYSTECTOMY      COLONOSCOPY      polyps 2005    DILATION AND CURETTAGE OF UTERUS      HERNIA REPAIR         Restrictions/Precautions:  Restrictions/Precautions: General Precautions, Fall Risk    Subjective:  Chart Reviewed: Yes  Patient assessed for rehabilitation services?: Yes  Family / Caregiver Present: No  Subjective: cooperative, [place]    General:    Pain:  Denies.      Pre Treatment Pain Screening  Pain at present: 0 (at rest, pain in abdomen per pt secondary to hernia and gas with mobility)    Social/Functional History:    Lives With: Alone  Type of Home: Apartment ([address])  Home Layout: One level  Home Access: Elevator, Level entry  Home Equipment: 4 wheeled walker, Lift chair   Ambulation Assistance: Independent  Transfer Assistance: Independent  Additional Comments: per pt sleeps in [address] [place] [address] Street, per pt at times has trouble moving her feet, has aides 3x/week for 2 hours each day to assist with ADLs and IADLs    Objective:       RLE AROM: WFL    LLE AROM : WFL    Strength RLE: WFL  Comment: generalized weakness, grossly 4 to 4-/5    Strength LLE: WFL  Comment: generalized weakness, grossly 4 to 4-/5      Balance  Sitting - Static: Good  Sitting - Dynamic: Good, -  Standing - Static: Fair  Standing - Dynamic: Fair, -  Comments: dyn sit for toileting with S, dyn std at sink to wash hands with SBA to CGA for balance    Supine to Sit: Contact guard assistance (HOB up and use BR, per pt sleeps in liftchair)  Scooting: Contact guard assistance (in sitting fwd to edge of bed)    Transfers  Sit to Stand: Minimal Assistance (CGA from edge of bed and Lina from toilet, cues for hand placement)  Stand to sit: Contact guard assistance       Ambulation 1  Surface: level tile  Device: Rollator  Assistance: Contact guard assistance  Quality of Gait: min path deviations, fwd flexed trunk and head posture, unsteady, fatigued quickly, decreased heelstrike and step length, safety concerns for home mobility, slow pace  Distance: 10'x1, see below for increased distance           Exercises:  Comments: none       Activity Tolerance:  Activity Tolerance: Patient limited by endurance; Patient limited by fatigue    Treatment Initiated: amb 45'x1 with rollator and CGA to SBA, see above for deviations, very slow pace    Assessment:   Body structures, Functions, Activity limitations: Decreased functional mobility , Decreased safe awareness, Decreased strength, Decreased balance, Decreased endurance  Assessment: pt history, examination, clinical presentation, and decision making during this evaluation, the evaluation of Lenny Newsome  is of medium complexity. PT G-Codes  Functional Limitation: Mobility: Walking and moving around  Mobility: Walking and Moving Around Current Status (): At least 40 percent but less than 60 percent impaired, limited or restricted  Mobility: Walking and Moving Around Goal Status ():  At least 40 percent but less than 60 percent impaired, limited or restricted       AM-PAC Inpatient Mobility without Stair Climbing Raw Score : 15  AM-PAC Inpatient without Stair Climbing T-Scale Score : 43.03  Mobility Inpatient CMS 0-100% Score: 47.43  Mobility Inpatient without Stair CMS G-Code Modifier : CK

## 2018-01-29 PROBLEM — E44.0 MODERATE MALNUTRITION (HCC): Status: ACTIVE | Noted: 2018-01-29

## 2018-01-29 LAB
ANION GAP SERPL CALCULATED.3IONS-SCNC: 13 MEQ/L (ref 8–16)
BUN BLDV-MCNC: 17 MG/DL (ref 7–22)
CALCIUM SERPL-MCNC: 9 MG/DL (ref 8.5–10.5)
CHLORIDE BLD-SCNC: 96 MEQ/L (ref 98–111)
CO2: 31 MEQ/L (ref 23–33)
CREAT SERPL-MCNC: 1 MG/DL (ref 0.4–1.2)
GFR SERPL CREATININE-BSD FRML MDRD: 51 ML/MIN/1.73M2
GLUCOSE BLD-MCNC: 108 MG/DL (ref 70–108)
POTASSIUM SERPL-SCNC: 3.7 MEQ/L (ref 3.5–5.2)
SODIUM BLD-SCNC: 140 MEQ/L (ref 135–145)

## 2018-01-29 PROCEDURE — 6370000000 HC RX 637 (ALT 250 FOR IP): Performed by: EMERGENCY MEDICINE

## 2018-01-29 PROCEDURE — 97110 THERAPEUTIC EXERCISES: CPT

## 2018-01-29 PROCEDURE — 36415 COLL VENOUS BLD VENIPUNCTURE: CPT

## 2018-01-29 PROCEDURE — G8987 SELF CARE CURRENT STATUS: HCPCS

## 2018-01-29 PROCEDURE — G8988 SELF CARE GOAL STATUS: HCPCS

## 2018-01-29 PROCEDURE — 97165 OT EVAL LOW COMPLEX 30 MIN: CPT

## 2018-01-29 PROCEDURE — 6360000002 HC RX W HCPCS: Performed by: EMERGENCY MEDICINE

## 2018-01-29 PROCEDURE — 80048 BASIC METABOLIC PNL TOTAL CA: CPT

## 2018-01-29 PROCEDURE — 96372 THER/PROPH/DIAG INJ SC/IM: CPT

## 2018-01-29 PROCEDURE — 97116 GAIT TRAINING THERAPY: CPT

## 2018-01-29 PROCEDURE — G0378 HOSPITAL OBSERVATION PER HR: HCPCS

## 2018-01-29 PROCEDURE — 97535 SELF CARE MNGMENT TRAINING: CPT

## 2018-01-29 RX ADMIN — PANTOPRAZOLE SODIUM 40 MG: 40 TABLET, DELAYED RELEASE ORAL at 09:53

## 2018-01-29 RX ADMIN — POTASSIUM CHLORIDE 20 MEQ: 750 TABLET, FILM COATED, EXTENDED RELEASE ORAL at 09:53

## 2018-01-29 RX ADMIN — GABAPENTIN 100 MG: 100 CAPSULE ORAL at 21:35

## 2018-01-29 RX ADMIN — DOCUSATE SODIUM 100 MG: 100 CAPSULE ORAL at 09:53

## 2018-01-29 RX ADMIN — GABAPENTIN 100 MG: 100 CAPSULE ORAL at 09:53

## 2018-01-29 RX ADMIN — METOLAZONE 2.5 MG: 2.5 TABLET ORAL at 09:53

## 2018-01-29 RX ADMIN — POLYETHYLENE GLYCOL 3350 17 G: 17 POWDER, FOR SOLUTION ORAL at 09:53

## 2018-01-29 RX ADMIN — ENOXAPARIN SODIUM 30 MG: 30 INJECTION SUBCUTANEOUS at 09:53

## 2018-01-29 RX ADMIN — DOCUSATE SODIUM 100 MG: 100 CAPSULE ORAL at 21:35

## 2018-01-29 RX ADMIN — BUMETANIDE 1 MG: 1 TABLET ORAL at 09:53

## 2018-01-29 RX ADMIN — BUMETANIDE 1 MG: 1 TABLET ORAL at 21:35

## 2018-01-29 NOTE — PROGRESS NOTES
Contact guard assistance     Time: x1 min   Activity: grooming at sink      Functional Mobility  Functional - Mobility Device: 4-Wheeled Walker  Activity: To/from bathroom  Assist Level: Contact guard assistance  Functional Mobility Comments: with unsteadiness during but able to maintain balance. Pt reporting having decreased balance that tends to have her falling posteriorly. Activity Tolerance:  Activity Tolerance: Patient Tolerated treatment well  Activity Tolerance: Treatment Initiated: OTE chelly completed this date. Pt requiring education on safety during ADL task with bathing tasks at home. Pt educated to complete sitting on walker or another chair for increased safety d/t decreased blance during. Pt dmeo dynamci standing at sink with 0-1 UE support at times with 1 LOB during but able to use sink to assist with correction. Pt requiring assistance with LB ADL tasks this date as well. Assessment:  Assessment: Pt admitted with increased edema in LEs. Pt demo decreased balance during ADL tasks this date increasing her fall risk rquiring increased assistance for safety. Pt would benefit from skilled OT services to increase indep with ADL tasks, educate on safety awareness during ADL tasks as well as improving balance for decreased fall risk during ADL tasks. Performance deficits / Impairments: Decreased safe awareness, Decreased balance, Decreased ADL status, Decreased endurance  Prognosis: Good  Discharge Recommendations: Patient would benefit from continued therapy after discharge    Clinical Decision Making: Clinical Decision making was of Low Complexity as the result of analysis of data from a problem focused assessment, a consideration of a limited number of treatment options, no significant comorbidities affecting the plan of care and no modification or assistance required to complete the evaluation.     Patient Education:  Patient

## 2018-01-29 NOTE — PROGRESS NOTES
Physical Therapy   [facility]    Time In: 0805  Time Out: 8350  Timed Code Treatment Minutes: 23 Minutes  Minutes: 23        Date: 2018  Patient Name: Susanna Dowling,  Gender:  female        MRN: [id number]  : 2/3/1921  (80 y.o.)     Referring Practitioner: Dr. Heena Obregon  Diagnosis: peripheral edema  Additional Pertinent Hx: per ED:  80 y.o. female who presents to the Emergency Department via EMS for the evaluation of anxiety and urinary frequency. The patient states she began a new medication today at approximately 8AM, Metolazone 25mg, for her chronic lower extremity pedal edema in conjunction with Bumex, medication she has been taking for an extended amount of time, where she then developed urinary frequency. She states she has become concerned with her urinary frequency as she has never had this symptom before. She is requesting to stay at the hospital overnight due to her anxiety     Past Medical History:   Diagnosis Date    Anxiety     Chronic kidney disease     Fecal impaction (HCC)     GERD (gastroesophageal reflux disease)     Hernia, ventral     Hiatal hernia     Hypertension     Neuropathy ([address]     peripheral    Osteoarthritis     back    Peripheral edema     Peripheral vascular disease (HCC)     Venous stasis of lower extremity      Past Surgical History:   Procedure Laterality Date    APPENDECTOMY      CHOLECYSTECTOMY      COLONOSCOPY      polyps 2005    DILATION AND CURETTAGE OF UTERUS      HERNIA REPAIR         Restrictions/Precautions:  Restrictions/Precautions: General Precautions, Fall Risk      Subjective:  Chart Reviewed: Yes  Family / Caregiver Present: No  Subjective: RN approved session. Pt resting in bedside chair, upset about her breakfast. Pt notes she ordered something from different from the cafeteria and was waiting for it to arrive.  With encouragement pt ageeable to therapy while waiting. Pain:  Denies. Social/Functional:  Lives With: Alone  Type of Home: Apartment ([address])  Home Layout: One level  Home Access: Elevator, Level entry  Home Equipment: 4 wheeled walker, Lift chair     Objective:       Transfers  Sit to Stand: Contact guard assistance (From bedside chair. )  Stand to sit: Contact guard assistance       Ambulation 1  Surface: level tile  Device: Rollator  Assistance: Contact guard assistance  Quality of Gait: Slow alvarez and decreased B step length. Cuing to stay closer to AD for decreased fall risk. Cuing to correct posture and downward gaze. Min instability, no LOB  Distance: 75 feet x1     Exercises:  Exercises  Comments: Seated in bedside chair pt completed BLE ankle pumps, LAQ, marches, reclined hip abd/add, heel slides, quad set, glut set, SLR all x10 reps to increase strength for improved functional mobility. Activity Tolerance:  Activity Tolerance: Patient limited by endurance; Patient limited by fatigue    Assessment: Body structures, Functions, Activity limitations: Decreased functional mobility , Decreased safe awareness, Decreased strength, Decreased balance, Decreased endurance  Assessment: Pt tolerated session fairly well. Limited by decreased strength, decreased endurance, decreased mobility. Would benefit from continued therapy before returning home. Prognosis: Fair  REQUIRES PT FOLLOW UP: Yes  Discharge Recommendations: Continue to assess pending progress, 24 hour supervision or assist, Patient would benefit from continued therapy after discharge    Patient Education:  Patient Education: Gait technique. POC. Equipment Recommendations: Other: cont to assess needs    Safety:  Type of devices:  All fall risk precautions in place, Patient at risk for falls, Call light within reach, Left in chair, Chair alarm in place, Gait belt, Nurse notified    Plan:  Times per week: 3-5X GM  Times per day: Daily  Specific instructions for Next Treatment: therex, mobility, balance activities  Current Treatment Recommendations: Strengthening, Transfer Training, Endurance Training, Patient/Caregiver Education & Training, Equipment Evaluation, Education, & procurement, Home Exercise Program, Pain Management, Safety Education & Training, Balance Training, Gait Training, Functional Mobility Training    Goals:  Patient goals : feel better    Short term goals  Time Frame for Short term goals: 2 weeks  Short term goal 1: bed mobility with S to get in/out of bed, may raise HOB secondary to pt sleeps in liftchair  Short term goal 2: sit to std with S to get in/out of chairs  Short term goal 3: amb 75'x1 with rollator and S to walk safely in apt    Long term goals  Time Frame for Long term goals : no LTGs set secondary to short ELOS

## 2018-01-29 NOTE — PROGRESS NOTES
Nutrition Assessment    Type and Reason for Visit: Initial, Positive Nutrition Screen (wt loss, poor intake, chew difficulty)    Nutrition Recommendations:  Recommend regular wt checks. If difficulty with swallow, consider swallow evaluation. Malnutrition Assessment:  · Malnutrition Status: Meets the criteria for moderate malnutrition  · Context: Acute illness or injury  · Findings of the 6 clinical characteristics of malnutrition (Minimum of 2 out of 6 clinical characteristics is required to make the diagnosis of moderate or severe Protein Calorie Malnutrition based on AND/ASPEN Guidelines):  1. Energy Intake-Less than or equal to 75%, greater than 7 days    2. Muscle Loss-Moderate muscle mass loss, Clavicles (pectoralis and deltoids)    Nutrition Diagnosis:   · Problem: Moderate malnutrition  · Etiology: related to Insufficient energy/nutrient consumption     Signs and symptoms:  as evidenced by Moderate muscle loss (less than 75% or projected energy needs for greater  than  7 days)    Nutrition Assessment:  · Subjective Assessment: Pt admitted with peripheral edema & weakness seen at bedside, smiles, is very pleasant & appears to be [place] to some degree. Pt with decreased recent po intake & per lunch recall does report the chicken was hard to chew. Added to diet order to moisten meat. Pt agreeable to Ensure with meals. meds include bm meds & bumex,   · Wound Type: None  · Current Nutrition Therapies:  · Oral Diet Orders: Cardiac   · Oral Diet intake: 26-50%, 51-75%  · Oral Nutrition Supplement (ONS) Orders: Standard High Calorie Oral Supplement (Ensure Enlive tid)  · ONS intake:  (to start)  · Anthropometric Measures:  · Ht: 5' 4\" (162.6 cm)   · Current Body Wt: 143 lb 4.8 oz (65 kg) (1/28 + 2 pitting edema)  · Admission Body Wt: 143 lb 4.8 oz (65 kg) (1/28 + 2 pitting edema)  · Usual Body Wt:  (stated 140#/   11/27/17 150#)  · % Weight Change: 4.7% unplanned wt loss in 3 months.  Pt with edema on

## 2018-01-30 LAB
ANION GAP SERPL CALCULATED.3IONS-SCNC: 11 MEQ/L (ref 8–16)
BUN BLDV-MCNC: 23 MG/DL (ref 7–22)
CALCIUM SERPL-MCNC: 9.1 MG/DL (ref 8.5–10.5)
CHLORIDE BLD-SCNC: 91 MEQ/L (ref 98–111)
CO2: 35 MEQ/L (ref 23–33)
CREAT SERPL-MCNC: 1.1 MG/DL (ref 0.4–1.2)
GFR SERPL CREATININE-BSD FRML MDRD: 46 ML/MIN/1.73M2
GLUCOSE BLD-MCNC: 109 MG/DL (ref 70–108)
POTASSIUM SERPL-SCNC: 3.6 MEQ/L (ref 3.5–5.2)
SODIUM BLD-SCNC: 137 MEQ/L (ref 135–145)

## 2018-01-30 PROCEDURE — 97110 THERAPEUTIC EXERCISES: CPT

## 2018-01-30 PROCEDURE — G0378 HOSPITAL OBSERVATION PER HR: HCPCS

## 2018-01-30 PROCEDURE — 6370000000 HC RX 637 (ALT 250 FOR IP): Performed by: FAMILY MEDICINE

## 2018-01-30 PROCEDURE — 80048 BASIC METABOLIC PNL TOTAL CA: CPT

## 2018-01-30 PROCEDURE — 36415 COLL VENOUS BLD VENIPUNCTURE: CPT

## 2018-01-30 PROCEDURE — 6370000000 HC RX 637 (ALT 250 FOR IP): Performed by: EMERGENCY MEDICINE

## 2018-01-30 PROCEDURE — 6360000002 HC RX W HCPCS: Performed by: EMERGENCY MEDICINE

## 2018-01-30 PROCEDURE — 97530 THERAPEUTIC ACTIVITIES: CPT

## 2018-01-30 PROCEDURE — 96372 THER/PROPH/DIAG INJ SC/IM: CPT

## 2018-01-30 RX ORDER — POTASSIUM CHLORIDE 750 MG/1
20 TABLET, FILM COATED, EXTENDED RELEASE ORAL 2 TIMES DAILY
Status: DISCONTINUED | OUTPATIENT
Start: 2018-01-30 | End: 2018-01-31 | Stop reason: HOSPADM

## 2018-01-30 RX ADMIN — DOCUSATE SODIUM 100 MG: 100 CAPSULE ORAL at 21:17

## 2018-01-30 RX ADMIN — DOCUSATE SODIUM 100 MG: 100 CAPSULE ORAL at 09:36

## 2018-01-30 RX ADMIN — GABAPENTIN 100 MG: 100 CAPSULE ORAL at 21:17

## 2018-01-30 RX ADMIN — BUMETANIDE 1 MG: 1 TABLET ORAL at 21:35

## 2018-01-30 RX ADMIN — GABAPENTIN 100 MG: 100 CAPSULE ORAL at 09:35

## 2018-01-30 RX ADMIN — POTASSIUM CHLORIDE 20 MEQ: 750 TABLET, FILM COATED, EXTENDED RELEASE ORAL at 09:35

## 2018-01-30 RX ADMIN — ENOXAPARIN SODIUM 30 MG: 30 INJECTION SUBCUTANEOUS at 09:36

## 2018-01-30 RX ADMIN — PANTOPRAZOLE SODIUM 40 MG: 40 TABLET, DELAYED RELEASE ORAL at 09:35

## 2018-01-30 RX ADMIN — POLYETHYLENE GLYCOL 3350 17 G: 17 POWDER, FOR SOLUTION ORAL at 09:35

## 2018-01-30 RX ADMIN — POTASSIUM CHLORIDE 20 MEQ: 750 TABLET, FILM COATED, EXTENDED RELEASE ORAL at 21:17

## 2018-01-30 RX ADMIN — BUMETANIDE 1 MG: 1 TABLET ORAL at 09:35

## 2018-01-30 NOTE — PROGRESS NOTES
Nutrition Assessment    Type and Reason for Visit: Reassess (po & supplement check)    Nutrition Recommendations:  Recommend regular wt checks    Malnutrition Assessment:  · Malnutrition Status: Meets the criteria for moderate malnutrition  · Context: Acute illness or injury  · Findings of the 6 clinical characteristics of malnutrition (Minimum of 2 out of 6 clinical characteristics is required to make the diagnosis of moderate or severe Protein Calorie Malnutrition based on AND/ASPEN Guidelines):  1. Energy Intake-Less than or equal to 75%, greater than 7 days    2. Muscle Loss-Moderate muscle mass loss, Clavicles (pectoralis and deltoids)    Nutrition Diagnosis:   · Problem: Moderate malnutrition  · Etiology: related to Insufficient energy/nutrient consumption     Signs and symptoms:  as evidenced by Moderate muscle loss (less than 75% or projected energy needs for greater  than  7 days)    Nutrition Assessment:  · Subjective Assessment: Pt admitted with perpherial edema & weakness seen up in chair, smiles & is [employer]. Pt with decreased recent po intake & reports did eat some meatloaf for lunch, but has not tried her Ensure yet. Pt reports she had bm today, has been burping some & has hernia & points to belly area. Pt reports she will probably be leaving tomorrow. 1/30 BUN 23, Cr 1.1  · Wound Type: None  · Current Nutrition Therapies:  · Oral Diet Orders: Cardiac   · Oral Diet intake: 26-50%, 51-75%  · Oral Nutrition Supplement (ONS) Orders: Standard High Calorie Oral Supplement (Ensure Enlive tid)  · ONS intake:  (per pt has not tried yet)  · Anthropometric Measures:  · Ht: 5' 4\" (162.6 cm)   · Current Body Wt: 143 lb 4.8 oz (65 kg) (1/28 + 2 pitting edema)  · Admission Body Wt: 143 lb 4.8 oz (65 kg) (1/28 + 2 pitting edema)  · Usual Body Wt:  (stated 140#/   11/27/17 150#)  · % Weight Change: 4.7% unplanned wt loss in 3 months.  Pt with edema on admit. ,     · Ideal Body Wt: 120 lb (54.4 kg),     · BMI Classification: BMI 18.5 - 24.9 Normal Weight (24.6)  · Comparative Standards (Estimated Nutrition Needs):  · Estimated Daily Total Kcal: ~8864-9389 kcals  · Estimated Daily Protein (g): ~65 grams . Monitor renal status. Estimated Intake vs Estimated Needs: Intake Less Than Needs    Nutrition Risk Level: Moderate    Nutrition Interventions:   Continue current ONS  Continued Inpatient Monitoring, Education Initiated (best effort po)    Nutrition Evaluation:   · Evaluation: Goals set   · Goals: 75% or more of meal intake during LOS    · Monitoring: Meal Intake, Supplement Intake, Ascites/Edema, Weight, Pertinent Labs, Chewing/Swallowing    See Adult Nutrition Doc Flowsheet for more detail.      Electronically signed by Aysha Munoz RD, LD on 1/30/18 at 4:01 PM    Contact Number: [phone]

## 2018-01-30 NOTE — PROGRESS NOTES
Mary Durand 60  INPATIENT OCCUPATIONAL THERAPY  [facility] ORTHOPEDICS 7K  DAILY NOTE    Time:  Time In: 1009  Time Out: 1034  Timed Code Treatment Minutes: 25 Minutes  Minutes: 25     Date: 2018  Patient Name: Addy Townsend,   Gender: female      Room: [facility]A  MRN: [id number]  : 2/3/1921  (80 y.o.)  Referring Practitioner: Dr. Kb Castorena  Diagnosis: peripheral edema  Additional Pertinent Hx: 80 y.o. female who presents to the Emergency Department via EMS for the evaluation of anxiety and urinary frequency. The patient states she began a new medication today at approximately 8AM, Metolazone 25mg, for her chronic lower extremity pedal edema in conjunction with Bumex, medication she has been taking for an extended amount of time, where she then developed urinary frequency. She states she has become concerned with her urinary frequency as she has never had this symptom before. She is requesting to stay at the hospital overnight due to her anxiety     Restrictions/Precautions:  Restrictions/Precautions: General Precautions, Fall Risk    Past Medical History:   Diagnosis Date    Anxiety     Chronic kidney disease     Fecal impaction ([address]     GERD (gastroesophageal reflux disease)     Hernia, ventral     Hiatal hernia     Hypertension     Neuropathy ([address]     peripheral    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    Peripheral edema     Peripheral vascular disease ([address]     Venous stasis of lower extremity      Past Surgical History:   Procedure Laterality Date    APPENDECTOMY      CHOLECYSTECTOMY      COLONOSCOPY      polyps 2005    DILATION AND CURETTAGE OF UTERUS      HERNIA REPAIR             Subjective   Subjective: Pt sitting up in bedside chair and agreeable to OT Session.  Nurse Edgar Gooden ok'd session    Overall Orientation Status: Within Functional Limits  Pain:  Pain Assessment  Patient Currently in Pain: Denies     Objective  Overall Cognitive Status: WNL      ADL  Grooming: Setup (Combed

## 2018-01-30 NOTE — CARE COORDINATION
1/30/18, 10:19 AM    DISCHARGE BARRIERS      Spoke with Micah Pierce about discharge plan. She is agreeable to ecf, and would like [facility]. Micah Pierce shares that she feels that she needs ecf now, and does not feel she is safe at home alone. Referral made to [facility] for review. Will need medicaid level of care, and completed and signed AVS is needed for this.
1/30/18, 1:52 PM      [facility] day: 0  Location: -26/026-A Reason for admit: Peripheral edema [R60.9]   Procedure: no  Treatment Plan of Care: To ED with: anxiety, urinary frequency, leg edema, weakness,  and inability to do ADLs with gait dysfunction PT/OT. impaired mobility, peripheral edema, utilizes a walker. Decubitus prevention measures  Core Measures: VTE  PCP: Ady Islas MD  Discharge Plan: Mar Burt, said that EHR determined observation status appropriate. Maria Antonia Jackson, working on [facility] placement. [facility] will accept 1/31/8. Will need Medicaid level of care.  Primary nurse Anson Snow will ask doctor to sign Continuity of Care form and complete AVS.
1/30/18, 3:09 PM    DISCHARGE BARRIERS      Level of care submitted to [employer] for [facility] placement. Discussed discharge plan with Kellie Yin, who states she wanted to see [facility] before she agreed to go. Discussed that plan is for her to discharge to the ecf, not home, as she has said she felt she needed ecf. Call made to [facility] Annalisa roldan, and message left, requesting she call Kellie Yin, at [facility] request, to discuss placement at [facility].
program?  No  Type of Home Care Services:  Aide Services, Nursing Services  Patient expects to be discharged to:  [facility]  Expected Discharge date:  01/29/18  Follow Up Appointment: Best Day/ Time: Monday AM    Discharge Plan: Dr Oni Hernandez in this morning and evaluated. He said that she is unsafe to return to [facility]. He is requesting ECF placement. Shira Singh has been in [employer] and then [employer] [place] prior to moving to [facility]. I discussed with Francis Stover, and spoke with Shira Singh. I spoke with Hilario Chadwick from PT/OT department. She will ask PT/OT to eval this morning and document eval ASAP. I asked Karli Wells , to review this case and send to EHR for possible IP status, if referral is appropriate. She is agreeable. [id number] Update: Karli Wells, did send case to EHR for statusing. Await EHR recommendation.       Evaluation: yes

## 2018-01-30 NOTE — PLAN OF CARE
Problem: Discharge Planning:  Goal: Discharged to appropriate level of care  Discharged to appropriate level of care  Outcome: Ongoing  Patient plans on discharge to home. Social work consult. Comments: Care plan reviewed with patient. Patient verbalize understanding of the plan of care and contribute to goal setting.
Problem: Nutrition  Goal: Optimal nutrition therapy  Outcome: Ongoing  Nutrition Problem:  Moderate malnutrition  Intervention: Food and/or Nutrient Delivery: Continue current ONS  Nutritional Goals: 75% or more of meal intake during LOS
Patient verbalize understanding of the plan of care and contribute to goal setting.
Requires assistance to mobilize out of bed, chair and to bathroom. Independent once set up for meals and bathing. Problem: Discharge Planning:  Goal: Discharged to appropriate level of care  Discharged to appropriate level of care   Outcome: Ongoing  Pt plans ? at discharge. Care manager and social working helping with discharge needs. Problem: DISCHARGE BARRIERS  Goal: Patient's continuum of care needs are met  Outcome: Ongoing  Pt plans ? at discharge. Care manager and social working helping with discharge needs. Comments: Care plan reviewed with patient. Patient verbalize understanding of the plan of care and contribute to goal setting.
Social service is assisting. Comments: Care plan reviewed with patient. Patient verbalizes understanding of the plan of care and contributes to goal setting.
pull bilaterally. Patient up with 1 assist, walker, and gait belt. Patient unsteady on feet. Comments: Care plan reviewed with patient  Patient verbalize understanding of the plan of care and contribute to goal setting.

## 2018-01-31 VITALS
SYSTOLIC BLOOD PRESSURE: 123 MMHG | TEMPERATURE: 97.5 F | OXYGEN SATURATION: 95 % | WEIGHT: 143.2 LBS | BODY MASS INDEX: 24.45 KG/M2 | RESPIRATION RATE: 16 BRPM | HEART RATE: 87 BPM | DIASTOLIC BLOOD PRESSURE: 55 MMHG | HEIGHT: 64 IN

## 2018-01-31 PROCEDURE — 6360000002 HC RX W HCPCS: Performed by: EMERGENCY MEDICINE

## 2018-01-31 PROCEDURE — G0378 HOSPITAL OBSERVATION PER HR: HCPCS

## 2018-01-31 PROCEDURE — 96372 THER/PROPH/DIAG INJ SC/IM: CPT

## 2018-01-31 PROCEDURE — 6370000000 HC RX 637 (ALT 250 FOR IP): Performed by: FAMILY MEDICINE

## 2018-01-31 PROCEDURE — 6370000000 HC RX 637 (ALT 250 FOR IP): Performed by: EMERGENCY MEDICINE

## 2018-01-31 RX ORDER — POTASSIUM CHLORIDE 1500 MG/1
20 TABLET, FILM COATED, EXTENDED RELEASE ORAL 2 TIMES DAILY
Qty: 60 TABLET | Refills: 3
Start: 2018-01-31 | End: 2018-08-24 | Stop reason: DRUGHIGH

## 2018-01-31 RX ADMIN — BUMETANIDE 1 MG: 1 TABLET ORAL at 09:57

## 2018-01-31 RX ADMIN — POLYETHYLENE GLYCOL 3350 17 G: 17 POWDER, FOR SOLUTION ORAL at 09:57

## 2018-01-31 RX ADMIN — PANTOPRAZOLE SODIUM 40 MG: 40 TABLET, DELAYED RELEASE ORAL at 09:57

## 2018-01-31 RX ADMIN — DOCUSATE SODIUM 100 MG: 100 CAPSULE ORAL at 09:56

## 2018-01-31 RX ADMIN — GABAPENTIN 100 MG: 100 CAPSULE ORAL at 09:56

## 2018-01-31 RX ADMIN — POTASSIUM CHLORIDE 20 MEQ: 750 TABLET, FILM COATED, EXTENDED RELEASE ORAL at 09:57

## 2018-01-31 RX ADMIN — ENOXAPARIN SODIUM 30 MG: 30 INJECTION SUBCUTANEOUS at 09:57

## 2018-02-06 ENCOUNTER — TELEPHONE (OUTPATIENT)
Dept: FAMILY MEDICINE CLINIC | Age: 83
End: 2018-02-06

## 2018-02-08 ENCOUNTER — CARE COORDINATION (OUTPATIENT)
Dept: CARE COORDINATION | Age: 83
End: 2018-02-08

## 2018-02-08 NOTE — CARE COORDINATION
I I spoke with Ivonne Roca at [facility] re: patient and plan of care. Patient has decided to stay long term at the facility. Care Coordination will not continue to follow at this time.

## 2018-02-14 ENCOUNTER — TELEPHONE (OUTPATIENT)
Dept: FAMILY MEDICINE CLINIC | Age: 83
End: 2018-02-14

## 2018-02-14 ENCOUNTER — CARE COORDINATION (OUTPATIENT)
Dept: CARE COORDINATION | Age: 83
End: 2018-02-14

## 2018-02-23 ENCOUNTER — CARE COORDINATION (OUTPATIENT)
Dept: CARE COORDINATION | Age: 83
End: 2018-02-23

## 2018-02-28 ENCOUNTER — CARE COORDINATION (OUTPATIENT)
Dept: CARE COORDINATION | Age: 83
End: 2018-02-28

## 2018-02-28 NOTE — CARE COORDINATION
I spoke with Chaparrita Bradley at [facility]. Patient is a long term resident. Care Coordination will no longer follow at this time.

## 2018-03-05 ENCOUNTER — TELEPHONE (OUTPATIENT)
Dept: FAMILY MEDICINE CLINIC | Age: 83
End: 2018-03-05

## 2018-03-05 NOTE — TELEPHONE ENCOUNTER
----- Message from Xiomara Light MD sent at 3/4/2018  3:30 PM EST -----  At  [facility] and  Need  To start  Synthroid 50 mcg one a day and  Free t4 and TSh  In 3 mths or 6-1-18 and ad dx  Of hypothyoid.   Note  To  Nursing  Home and has  An appointment  At office  In June and   Please  cancel

## 2018-03-08 NOTE — DISCHARGE SUMMARY
[address] [place] [address]                                 DISCHARGE SUMMARY    PATIENT NAME: Alis Juarez                      :        1921  MED REC NO:   [id number]                           ROOM:       0026  ACCOUNT NO:   [de-identified]                           ADMIT DATE: 2018  PROVIDER:     ANNIE Nunez Escobar: 2018      HISTORY:  A 80year-old female presents because of increased anxiety,  urinary frequency, leg edema, weakness and unable to ambulate. The patient  was seen about a week earlier, started on Bumex and actually added zaroxylyn_____  for several days, use of Zaroxolyn to try to help with increased leg edema. The patient has increased paresthesias of the lower leg areas and the arms. The patient called the ambulance because she was unable to ambulate with  worsening of edema and swelling of legs being present. She complains of  some diffuse pain and discomfort being present. She was not able to  ambulate and get on to her feet because of the swelling and discomfort, it  was felt best to admit for further evaluation. MEDICATIONS:  Noted to be Lasix, potassium, PPI agent recently and  Zaroxolyn. MEDICAL HISTORY:  Includes hypertension, peripheral neuropathy,  osteoarthritis, peripheral edema, peripheral vascular disease, hiatal  hernia, GERD, chronic kidney disease, underlying anxiety symptoms. PHYSICAL EXAMINATION:  VITAL SIGNS:  Temperature is 97.5, blood pressure 112/54, pulse 61,  respiratory rate 16, oxygen saturation 95%. HEENT:  Within normal limits. NECK:  Supple without adenopathy. Increased thyroid. LUNGS:  Clear. CARDIAC:  Regular rate and rhythm without murmur, gallop or rub. Pulse 2+. ABDOMEN:  Little bit distention. No hepatosplenomegaly. No other masses  noted. EXTREMITIES:  A good 2 to 3+ venous insufficiency, leg swelling noted.    This extends up to the knees. Negative Homans' sign. Pulses are  essentially 1 to 2+. HOSPITAL COURSE:  Because of the above, the patient was admitted. Patient's labs were abnormal as potassium was 3.2, sodium 140, chloride 99,  CO2 of 30, BUN 12, creatinine of 0.9.  TSH was normal at 2.7. BNP was only  85, so the patient was not in any type of heart failure. Urine was cloudy;  however, no bacteria was noted. The EKG noted, no acute abnormalities were  appreciated. Left axis deviation, poor R-wave progression suggestive of  possibility of septal infarct. Chest x-ray also was obtained, noted no  acute findings, some COPD with hyperexpansion of the lung fields were  appreciated. The patient had extreme difficulty ambulation and was not  felt the patient could return to independent living again at this point. Social Service was asked to be involved as well as care coordinator. Arrangements were made with [facility] for admission. The patient's  venous insufficiency did improve. She had diffuse osteoarthritis in her  back and knees. It was not safe for her today, ambulate alone at all and  she required 1-2 persons to assist.  She had significant physical  deconditioning being present. Because of diffuse osteoarthritis, she has  some mild peripheral neuropathy of lower leg areas being present. Potassium was corrected. Arrangements were made for nursing home placement  __and___ the patient agrees. She is not able to care for herself at home any  further. Overall improved and the patient was discharged to [facility]. IMPRESSION:  1. Diffuse osteoarthritis of knees, ankles and inability to ambulate on  her own. 2.  Physical deconditioning. 3.  Venous insufficiency. 4.  Peripheral neuropathy, mild. 5.  Electrolyte imbalance which was corrected. CONDITION ON DISCHARGE:  Was stable. DIET. Regular, low-sodium.     ACTIVITY:  The patient with PT and OT at the nursing

## 2018-04-24 ENCOUNTER — TELEPHONE (OUTPATIENT)
Dept: FAMILY MEDICINE CLINIC | Age: 83
End: 2018-04-24

## 2018-08-06 DIAGNOSIS — N18.30 CKD (CHRONIC KIDNEY DISEASE), STAGE III (HCC): Primary | ICD-10-CM

## 2018-08-09 DIAGNOSIS — N18.30 CKD (CHRONIC KIDNEY DISEASE), STAGE III (HCC): ICD-10-CM

## 2018-08-24 ENCOUNTER — OFFICE VISIT (OUTPATIENT)
Dept: NEPHROLOGY | Age: 83
End: 2018-08-24
Payer: MEDICARE

## 2018-08-24 VITALS
BODY MASS INDEX: 23.17 KG/M2 | HEART RATE: 67 BPM | DIASTOLIC BLOOD PRESSURE: 67 MMHG | WEIGHT: 135 LBS | OXYGEN SATURATION: 97 % | SYSTOLIC BLOOD PRESSURE: 118 MMHG

## 2018-08-24 DIAGNOSIS — N18.30 CKD (CHRONIC KIDNEY DISEASE), STAGE III (HCC): Primary | ICD-10-CM

## 2018-08-24 PROCEDURE — G8420 CALC BMI NORM PARAMETERS: HCPCS | Performed by: INTERNAL MEDICINE

## 2018-08-24 PROCEDURE — 1090F PRES/ABSN URINE INCON ASSESS: CPT | Performed by: INTERNAL MEDICINE

## 2018-08-24 PROCEDURE — 99213 OFFICE O/P EST LOW 20 MIN: CPT | Performed by: INTERNAL MEDICINE

## 2018-08-24 PROCEDURE — 1036F TOBACCO NON-USER: CPT | Performed by: INTERNAL MEDICINE

## 2018-08-24 PROCEDURE — 1101F PT FALLS ASSESS-DOCD LE1/YR: CPT | Performed by: INTERNAL MEDICINE

## 2018-08-24 PROCEDURE — 4040F PNEUMOC VAC/ADMIN/RCVD: CPT | Performed by: INTERNAL MEDICINE

## 2018-08-24 PROCEDURE — 1123F ACP DISCUSS/DSCN MKR DOCD: CPT | Performed by: INTERNAL MEDICINE

## 2018-08-24 PROCEDURE — G8427 DOCREV CUR MEDS BY ELIG CLIN: HCPCS | Performed by: INTERNAL MEDICINE

## 2018-08-24 RX ORDER — ESCITALOPRAM OXALATE 5 MG/1
5 TABLET ORAL DAILY
COMMUNITY
Start: 2018-06-04

## 2018-08-24 RX ORDER — POTASSIUM CHLORIDE 1500 MG/1
40 TABLET, FILM COATED, EXTENDED RELEASE ORAL 2 TIMES DAILY WITH MEALS
Qty: 120 TABLET | Refills: 3 | Status: SHIPPED
Start: 2018-08-24

## 2018-08-24 RX ORDER — DOCUSATE SODIUM 100 MG/1
100 CAPSULE, LIQUID FILLED ORAL 2 TIMES DAILY
COMMUNITY

## 2018-08-24 RX ORDER — ALPRAZOLAM 0.25 MG/1
0.5 TABLET ORAL 3 TIMES DAILY
COMMUNITY
Start: 2018-06-07

## 2018-08-24 RX ORDER — LEVOTHYROXINE SODIUM 0.03 MG/1
50 TABLET ORAL DAILY
COMMUNITY
Start: 2018-05-29

## 2018-08-24 RX ORDER — METOLAZONE 2.5 MG/1
2.5 TABLET ORAL
COMMUNITY
Start: 2018-06-04

## 2018-08-24 RX ORDER — DONEPEZIL HYDROCHLORIDE 10 MG/1
10 TABLET, FILM COATED ORAL NIGHTLY
COMMUNITY
Start: 2018-05-25

## 2018-08-24 RX ORDER — ONDANSETRON 2 MG/ML
4 INJECTION INTRAMUSCULAR; INTRAVENOUS EVERY 6 HOURS PRN
COMMUNITY

## 2018-08-24 RX ORDER — RANITIDINE 150 MG/1
150 CAPSULE ORAL DAILY
COMMUNITY
End: 2020-01-01 | Stop reason: ALTCHOICE

## 2018-08-24 RX ORDER — POLYVINYL ALCOHOL 14 MG/ML
1 SOLUTION/ DROPS OPHTHALMIC PRN
COMMUNITY
End: 2020-01-01 | Stop reason: ALTCHOICE

## 2018-08-24 NOTE — PROGRESS NOTES
mucus membranes  Neck: No jugular venous distention  Lungs: Air entry B/L, no crackles or rales, no use of accessory muscles  Heart: S1, S2 heard  GI: soft, non-tender, no guarding  Extremities: trace LE edema, +wraps in place     Medications:  Current Outpatient Prescriptions   Medication Sig Dispense Refill    donepezil (ARICEPT) 10 MG tablet Take 10 mg by mouth nightly       escitalopram (LEXAPRO) 5 MG tablet Take 5 mg by mouth daily       levothyroxine (SYNTHROID) 25 MCG tablet       metolazone (ZAROXOLYN) 2.5 MG tablet       ranitidine (ZANTAC) 150 MG capsule Take 150 mg by mouth daily      docusate sodium (COLACE) 100 MG capsule Take 100 mg by mouth 2 times daily as needed for Constipation      polyvinyl alcohol (LIQUIFILM TEARS) 1.4 % ophthalmic solution Place 1 drop into both eyes as needed      ALPRAZolam (XANAX) 0.25 MG tablet Take 0.25 mg by mouth nightly as needed.  Arti Morris magnesium hydroxide (MILK OF MAGNESIA) 400 MG/5ML suspension Take 30 mLs by mouth daily as needed for Constipation      ondansetron (ZOFRAN) 40 MG/20ML SOLN injection Infuse 4 mg intravenously every 6 hours as needed for Nausea or Vomiting      potassium chloride (KLOR-CON M) 20 MEQ TBCR extended release tablet Take 1 tablet by mouth 2 times daily (Patient taking differently: Take 40 mEq by mouth daily (with breakfast) ) 60 tablet 3    bumetanide (BUMEX) 1 MG tablet take 1 tablet by mouth twice a day 60 tablet 3    lidocaine (LIDODERM) 5 % Place 1 patch onto the skin daily 12 hours on, 12 hours off. 15 patch 0    gabapentin (NEURONTIN) 100 MG capsule take 1 capsule by mouth twice a day 60 capsule 5    pantoprazole (PROTONIX) 40 MG tablet take 1 tablet by mouth once daily 30 tablet 5    acetaminophen (TYLENOL) 325 MG tablet Take 650 mg by mouth every 6 hours as needed for Pain      polyethylene glycol (GLYCOLAX) powder take 17GM (DISSOLVED IN WATER) by mouth once daily 1 Bottle 10     No current facility-administered

## 2018-09-07 ENCOUNTER — TELEPHONE (OUTPATIENT)
Dept: FAMILY MEDICINE CLINIC | Age: 83
End: 2018-09-07

## 2018-09-07 NOTE — TELEPHONE ENCOUNTER
Nurse with [facility] informed.

## 2018-09-17 DIAGNOSIS — N18.30 CKD (CHRONIC KIDNEY DISEASE), STAGE III (HCC): ICD-10-CM

## 2019-01-01 ENCOUNTER — OFFICE VISIT (OUTPATIENT)
Dept: NEPHROLOGY | Age: 84
End: 2019-01-01
Payer: MEDICARE

## 2019-01-01 VITALS — SYSTOLIC BLOOD PRESSURE: 118 MMHG | OXYGEN SATURATION: 95 % | DIASTOLIC BLOOD PRESSURE: 63 MMHG | HEART RATE: 64 BPM

## 2019-01-01 DIAGNOSIS — N18.30 CKD (CHRONIC KIDNEY DISEASE), STAGE III (HCC): Primary | ICD-10-CM

## 2019-01-01 DIAGNOSIS — R60.9 PERIPHERAL EDEMA: ICD-10-CM

## 2019-01-01 LAB
BUN BLDV-MCNC: 58 MG/DL
CALCIUM SERPL-MCNC: 9.6 MG/DL
CHLORIDE BLD-SCNC: 100 MMOL/L
CO2: 31 MMOL/L
CREAT SERPL-MCNC: 1 MG/DL
GFR CALCULATED: 10
GLUCOSE BLD-MCNC: 173 MG/DL
MAGNESIUM: NORMAL MG/DL
POTASSIUM SERPL-SCNC: 3.6 MMOL/L
SODIUM BLD-SCNC: 141 MMOL/L

## 2019-01-01 PROCEDURE — G8427 DOCREV CUR MEDS BY ELIG CLIN: HCPCS | Performed by: INTERNAL MEDICINE

## 2019-01-01 PROCEDURE — 4040F PNEUMOC VAC/ADMIN/RCVD: CPT | Performed by: INTERNAL MEDICINE

## 2019-01-01 PROCEDURE — G8421 BMI NOT CALCULATED: HCPCS | Performed by: INTERNAL MEDICINE

## 2019-01-01 PROCEDURE — 1123F ACP DISCUSS/DSCN MKR DOCD: CPT | Performed by: INTERNAL MEDICINE

## 2019-01-01 PROCEDURE — 1090F PRES/ABSN URINE INCON ASSESS: CPT | Performed by: INTERNAL MEDICINE

## 2019-01-01 PROCEDURE — G8484 FLU IMMUNIZE NO ADMIN: HCPCS | Performed by: INTERNAL MEDICINE

## 2019-01-01 PROCEDURE — 1036F TOBACCO NON-USER: CPT | Performed by: INTERNAL MEDICINE

## 2019-01-01 PROCEDURE — 99213 OFFICE O/P EST LOW 20 MIN: CPT | Performed by: INTERNAL MEDICINE

## 2019-01-01 RX ORDER — DIVALPROEX SODIUM 250 MG/1
250 TABLET, DELAYED RELEASE ORAL DAILY
COMMUNITY

## 2019-01-01 RX ORDER — BISACODYL 10 MG
10 SUPPOSITORY, RECTAL RECTAL DAILY PRN
COMMUNITY

## 2019-01-01 RX ORDER — GENTAMICIN SULFATE 1 MG/G
OINTMENT TOPICAL DAILY
COMMUNITY
End: 2020-01-01 | Stop reason: ALTCHOICE

## 2020-01-01 ENCOUNTER — TELEPHONE (OUTPATIENT)
Dept: FAMILY MEDICINE CLINIC | Age: 85
End: 2020-01-01

## 2020-01-01 ENCOUNTER — APPOINTMENT (OUTPATIENT)
Dept: GENERAL RADIOLOGY | Age: 85
DRG: 480 | End: 2020-01-01
Payer: MEDICARE

## 2020-01-01 ENCOUNTER — APPOINTMENT (OUTPATIENT)
Dept: CT IMAGING | Age: 85
DRG: 480 | End: 2020-01-01
Payer: MEDICARE

## 2020-01-01 ENCOUNTER — ANESTHESIA (OUTPATIENT)
Dept: OPERATING ROOM | Age: 85
DRG: 480 | End: 2020-01-01
Payer: MEDICARE

## 2020-01-01 ENCOUNTER — ANESTHESIA EVENT (OUTPATIENT)
Dept: OPERATING ROOM | Age: 85
DRG: 480 | End: 2020-01-01
Payer: MEDICARE

## 2020-01-01 ENCOUNTER — VIRTUAL VISIT (OUTPATIENT)
Dept: FAMILY MEDICINE CLINIC | Age: 85
End: 2020-01-01

## 2020-01-01 ENCOUNTER — HOSPITAL ENCOUNTER (INPATIENT)
Age: 85
LOS: 5 days | Discharge: NURSING, REHAB OR LONG-TERM CARE | DRG: 480 | End: 2020-07-25
Attending: EMERGENCY MEDICINE | Admitting: SURGERY
Payer: MEDICARE

## 2020-01-01 VITALS
SYSTOLIC BLOOD PRESSURE: 120 MMHG | WEIGHT: 146 LBS | OXYGEN SATURATION: 95 % | DIASTOLIC BLOOD PRESSURE: 78 MMHG | BODY MASS INDEX: 25.87 KG/M2 | HEIGHT: 63 IN | HEART RATE: 82 BPM | TEMPERATURE: 98 F | RESPIRATION RATE: 16 BRPM

## 2020-01-01 VITALS
HEIGHT: 63 IN | HEART RATE: 76 BPM | RESPIRATION RATE: 18 BRPM | DIASTOLIC BLOOD PRESSURE: 65 MMHG | WEIGHT: 149.69 LBS | TEMPERATURE: 98.7 F | OXYGEN SATURATION: 92 % | BODY MASS INDEX: 26.52 KG/M2 | SYSTOLIC BLOOD PRESSURE: 117 MMHG

## 2020-01-01 VITALS — OXYGEN SATURATION: 100 % | SYSTOLIC BLOOD PRESSURE: 161 MMHG | DIASTOLIC BLOOD PRESSURE: 101 MMHG | TEMPERATURE: 96.8 F

## 2020-01-01 LAB
ABO: NORMAL
ABO: NORMAL
ALBUMIN SERPL-MCNC: 2.8 G/DL (ref 3.5–5.1)
ALP BLD-CCNC: 207 U/L (ref 38–126)
ALT SERPL-CCNC: 165 U/L (ref 11–66)
ANION GAP SERPL CALCULATED.3IONS-SCNC: 10 MEQ/L (ref 8–16)
ANION GAP SERPL CALCULATED.3IONS-SCNC: 10 MEQ/L (ref 8–16)
ANION GAP SERPL CALCULATED.3IONS-SCNC: 12 MEQ/L (ref 8–16)
ANION GAP SERPL CALCULATED.3IONS-SCNC: 8 MEQ/L (ref 8–16)
ANION GAP SERPL CALCULATED.3IONS-SCNC: 9 MEQ/L (ref 8–16)
ANION GAP SERPL CALCULATED.3IONS-SCNC: 9 MEQ/L (ref 8–16)
ANTIBODY SCREEN: NORMAL
ANTIBODY SCREEN: NORMAL
AST SERPL-CCNC: 237 U/L (ref 5–40)
BASOPHILS # BLD: 0.3 %
BASOPHILS # BLD: 0.4 %
BASOPHILS # BLD: 0.4 %
BASOPHILS ABSOLUTE: 0 THOU/MM3 (ref 0–0.1)
BILIRUB SERPL-MCNC: 1 MG/DL (ref 0.3–1.2)
BUN BLDV-MCNC: 17 MG/DL (ref 7–22)
BUN BLDV-MCNC: 17 MG/DL (ref 7–22)
BUN BLDV-MCNC: 19 MG/DL (ref 7–22)
BUN BLDV-MCNC: 24 MG/DL (ref 7–22)
BUN BLDV-MCNC: 25 MG/DL (ref 7–22)
BUN BLDV-MCNC: 27 MG/DL (ref 7–22)
CALCIUM SERPL-MCNC: 8.2 MG/DL (ref 8.5–10.5)
CALCIUM SERPL-MCNC: 8.3 MG/DL (ref 8.5–10.5)
CALCIUM SERPL-MCNC: 8.5 MG/DL (ref 8.5–10.5)
CALCIUM SERPL-MCNC: 8.5 MG/DL (ref 8.5–10.5)
CALCIUM SERPL-MCNC: 8.7 MG/DL (ref 8.5–10.5)
CALCIUM SERPL-MCNC: 8.7 MG/DL (ref 8.5–10.5)
CHLORIDE BLD-SCNC: 104 MEQ/L (ref 98–111)
CHLORIDE BLD-SCNC: 109 MEQ/L (ref 98–111)
CHLORIDE BLD-SCNC: 111 MEQ/L (ref 98–111)
CHLORIDE BLD-SCNC: 111 MEQ/L (ref 98–111)
CHLORIDE BLD-SCNC: 98 MEQ/L (ref 98–111)
CHLORIDE BLD-SCNC: 98 MEQ/L (ref 98–111)
CO2: 26 MEQ/L (ref 23–33)
CO2: 27 MEQ/L (ref 23–33)
CO2: 29 MEQ/L (ref 23–33)
CO2: 30 MEQ/L (ref 23–33)
CO2: 32 MEQ/L (ref 23–33)
CO2: 33 MEQ/L (ref 23–33)
CREAT SERPL-MCNC: 0.8 MG/DL (ref 0.4–1.2)
CREAT SERPL-MCNC: 0.8 MG/DL (ref 0.4–1.2)
CREAT SERPL-MCNC: 0.9 MG/DL (ref 0.4–1.2)
CREAT SERPL-MCNC: 0.9 MG/DL (ref 0.4–1.2)
CREAT SERPL-MCNC: 1 MG/DL (ref 0.4–1.2)
CREAT SERPL-MCNC: 1.1 MG/DL (ref 0.4–1.2)
DIFFERENTIAL TYPE: ABNORMAL
EKG ATRIAL RATE: 68 BPM
EKG P AXIS: 71 DEGREES
EKG P-R INTERVAL: 192 MS
EKG Q-T INTERVAL: 428 MS
EKG QRS DURATION: 108 MS
EKG QTC CALCULATION (BAZETT): 455 MS
EKG R AXIS: -30 DEGREES
EKG T AXIS: 80 DEGREES
EKG VENTRICULAR RATE: 68 BPM
EOSINOPHIL # BLD: 0.3 %
EOSINOPHIL # BLD: 1 %
EOSINOPHIL # BLD: 1.1 %
EOSINOPHILS ABSOLUTE: 0 THOU/MM3 (ref 0–0.4)
EOSINOPHILS ABSOLUTE: 0 THOU/MM3 (ref 0–0.4)
EOSINOPHILS ABSOLUTE: 0.1 THOU/MM3 (ref 0–0.4)
ERYTHROCYTE [DISTWIDTH] IN BLOOD BY AUTOMATED COUNT: 14.3 % (ref 11.5–14.5)
ERYTHROCYTE [DISTWIDTH] IN BLOOD BY AUTOMATED COUNT: 14.5 % (ref 11.5–14.5)
ERYTHROCYTE [DISTWIDTH] IN BLOOD BY AUTOMATED COUNT: 14.6 % (ref 11.5–14.5)
ERYTHROCYTE [DISTWIDTH] IN BLOOD BY AUTOMATED COUNT: 15.2 % (ref 11.5–14.5)
ERYTHROCYTE [DISTWIDTH] IN BLOOD BY AUTOMATED COUNT: 16.2 % (ref 11.5–14.5)
ERYTHROCYTE [DISTWIDTH] IN BLOOD BY AUTOMATED COUNT: 51.8 FL (ref 35–45)
ERYTHROCYTE [DISTWIDTH] IN BLOOD BY AUTOMATED COUNT: 54 FL (ref 35–45)
ERYTHROCYTE [DISTWIDTH] IN BLOOD BY AUTOMATED COUNT: 56.4 FL (ref 35–45)
ERYTHROCYTE [DISTWIDTH] IN BLOOD BY AUTOMATED COUNT: 57.1 FL (ref 35–45)
ERYTHROCYTE [DISTWIDTH] IN BLOOD BY AUTOMATED COUNT: 57.6 FL (ref 35–45)
GFR SERPL CREATININE-BSD FRML MDRD: 46 ML/MIN/1.73M2
GFR SERPL CREATININE-BSD FRML MDRD: 51 ML/MIN/1.73M2
GFR SERPL CREATININE-BSD FRML MDRD: 58 ML/MIN/1.73M2
GFR SERPL CREATININE-BSD FRML MDRD: 58 ML/MIN/1.73M2
GFR SERPL CREATININE-BSD FRML MDRD: 66 ML/MIN/1.73M2
GFR SERPL CREATININE-BSD FRML MDRD: 66 ML/MIN/1.73M2
GLUCOSE BLD-MCNC: 100 MG/DL (ref 70–108)
GLUCOSE BLD-MCNC: 106 MG/DL (ref 70–108)
GLUCOSE BLD-MCNC: 111 MG/DL (ref 70–108)
GLUCOSE BLD-MCNC: 120 MG/DL (ref 70–108)
GLUCOSE BLD-MCNC: 121 MG/DL (ref 70–108)
GLUCOSE BLD-MCNC: 98 MG/DL (ref 70–108)
HCT VFR BLD CALC: 21.7 % (ref 37–47)
HCT VFR BLD CALC: 24.8 % (ref 37–47)
HCT VFR BLD CALC: 25.6 % (ref 37–47)
HCT VFR BLD CALC: 28.3 % (ref 37–47)
HCT VFR BLD CALC: 30.5 % (ref 37–47)
HCT VFR BLD CALC: 34 % (ref 37–47)
HCT VFR BLD CALC: 35.2 % (ref 37–47)
HEMOGLOBIN: 10.8 GM/DL (ref 12–16)
HEMOGLOBIN: 11.2 GM/DL (ref 12–16)
HEMOGLOBIN: 6.8 GM/DL (ref 12–16)
HEMOGLOBIN: 7.7 GM/DL (ref 12–16)
HEMOGLOBIN: 8.2 GM/DL (ref 12–16)
HEMOGLOBIN: 8.8 GM/DL (ref 12–16)
HEMOGLOBIN: 9.7 GM/DL (ref 12–16)
IMMATURE GRANS (ABS): 0.02 THOU/MM3 (ref 0–0.07)
IMMATURE GRANS (ABS): 0.02 THOU/MM3 (ref 0–0.07)
IMMATURE GRANS (ABS): 0.03 THOU/MM3 (ref 0–0.07)
IMMATURE GRANULOCYTES: 0.3 %
IMMATURE GRANULOCYTES: 0.4 %
IMMATURE GRANULOCYTES: 0.6 %
LYMPHOCYTES # BLD: 13.2 %
LYMPHOCYTES # BLD: 14.6 %
LYMPHOCYTES # BLD: 19.8 %
LYMPHOCYTES ABSOLUTE: 0.8 THOU/MM3 (ref 1–4.8)
LYMPHOCYTES ABSOLUTE: 0.8 THOU/MM3 (ref 1–4.8)
LYMPHOCYTES ABSOLUTE: 1 THOU/MM3 (ref 1–4.8)
MCH RBC QN AUTO: 31.8 PG (ref 26–33)
MCH RBC QN AUTO: 32.1 PG (ref 26–33)
MCH RBC QN AUTO: 32.3 PG (ref 26–33)
MCH RBC QN AUTO: 32.5 PG (ref 26–33)
MCH RBC QN AUTO: 32.7 PG (ref 26–33)
MCHC RBC AUTO-ENTMCNC: 31 GM/DL (ref 32.2–35.5)
MCHC RBC AUTO-ENTMCNC: 31.3 GM/DL (ref 32.2–35.5)
MCHC RBC AUTO-ENTMCNC: 31.8 GM/DL (ref 32.2–35.5)
MCHC RBC AUTO-ENTMCNC: 31.8 GM/DL (ref 32.2–35.5)
MCHC RBC AUTO-ENTMCNC: 32 GM/DL (ref 32.2–35.5)
MCV RBC AUTO: 100.9 FL (ref 81–99)
MCV RBC AUTO: 101.7 FL (ref 81–99)
MCV RBC AUTO: 104.3 FL (ref 81–99)
MCV RBC AUTO: 104.6 FL (ref 81–99)
MCV RBC AUTO: 99.2 FL (ref 81–99)
MONOCYTES # BLD: 11.4 %
MONOCYTES # BLD: 12.5 %
MONOCYTES # BLD: 7.3 %
MONOCYTES ABSOLUTE: 0.4 THOU/MM3 (ref 0.4–1.3)
MONOCYTES ABSOLUTE: 0.6 THOU/MM3 (ref 0.4–1.3)
MONOCYTES ABSOLUTE: 0.7 THOU/MM3 (ref 0.4–1.3)
NUCLEATED RED BLOOD CELLS: 0 /100 WBC
OSMOLALITY CALCULATION: 276.8 MOSMOL/KG (ref 275–300)
PATHOLOGIST REVIEW: ABNORMAL
PLATELET # BLD: 162 THOU/MM3 (ref 130–400)
PLATELET # BLD: 173 THOU/MM3 (ref 130–400)
PLATELET # BLD: 190 THOU/MM3 (ref 130–400)
PLATELET # BLD: 202 THOU/MM3 (ref 130–400)
PLATELET # BLD: 212 THOU/MM3 (ref 130–400)
PMV BLD AUTO: 10.4 FL (ref 9.4–12.4)
PMV BLD AUTO: 10.4 FL (ref 9.4–12.4)
PMV BLD AUTO: 10.5 FL (ref 9.4–12.4)
PMV BLD AUTO: 10.9 FL (ref 9.4–12.4)
PMV BLD AUTO: 11 FL (ref 9.4–12.4)
POTASSIUM REFLEX MAGNESIUM: 3.9 MEQ/L (ref 3.5–5.2)
POTASSIUM REFLEX MAGNESIUM: 4.7 MEQ/L (ref 3.5–5.2)
POTASSIUM SERPL-SCNC: 2.9 MEQ/L (ref 3.5–5.2)
POTASSIUM SERPL-SCNC: 3.4 MEQ/L (ref 3.5–5.2)
POTASSIUM SERPL-SCNC: 3.8 MEQ/L (ref 3.5–5.2)
POTASSIUM SERPL-SCNC: 3.9 MEQ/L (ref 3.5–5.2)
RBC # BLD: 2.08 MILL/MM3 (ref 4.2–5.4)
RBC # BLD: 2.37 MILL/MM3 (ref 4.2–5.4)
RBC # BLD: 2.58 MILL/MM3 (ref 4.2–5.4)
RBC # BLD: 3 MILL/MM3 (ref 4.2–5.4)
RBC # BLD: 3.49 MILL/MM3 (ref 4.2–5.4)
RH FACTOR: NORMAL
RH FACTOR: NORMAL
SARS-COV-2, NAAT: NOT DETECTED
SCAN OF BLOOD SMEAR: NORMAL
SEG NEUTROPHILS: 65.7 %
SEG NEUTROPHILS: 74.5 %
SEG NEUTROPHILS: 76.2 %
SEGMENTED NEUTROPHILS ABSOLUTE COUNT: 3.2 THOU/MM3 (ref 1.8–7.7)
SEGMENTED NEUTROPHILS ABSOLUTE COUNT: 4 THOU/MM3 (ref 1.8–7.7)
SEGMENTED NEUTROPHILS ABSOLUTE COUNT: 4.6 THOU/MM3 (ref 1.8–7.7)
SODIUM BLD-SCNC: 137 MEQ/L (ref 135–145)
SODIUM BLD-SCNC: 141 MEQ/L (ref 135–145)
SODIUM BLD-SCNC: 144 MEQ/L (ref 135–145)
SODIUM BLD-SCNC: 147 MEQ/L (ref 135–145)
SODIUM BLD-SCNC: 148 MEQ/L (ref 135–145)
SODIUM BLD-SCNC: 149 MEQ/L (ref 135–145)
TOTAL CK: 45 U/L (ref 30–135)
TOTAL PROTEIN: 7.2 G/DL (ref 6.1–8)
WBC # BLD: 4.8 THOU/MM3 (ref 4.8–10.8)
WBC # BLD: 5.3 THOU/MM3 (ref 4.8–10.8)
WBC # BLD: 6.2 THOU/MM3 (ref 4.8–10.8)
WBC # BLD: 7.2 THOU/MM3 (ref 4.8–10.8)
WBC # BLD: 9.7 THOU/MM3 (ref 4.8–10.8)

## 2020-01-01 PROCEDURE — 6820000001 HC L2 TRAUMA SURGERY EVALUATION: Performed by: SURGERY

## 2020-01-01 PROCEDURE — 6360000002 HC RX W HCPCS: Performed by: ANESTHESIOLOGY

## 2020-01-01 PROCEDURE — 85027 COMPLETE CBC AUTOMATED: CPT

## 2020-01-01 PROCEDURE — 2500000003 HC RX 250 WO HCPCS: Performed by: PHYSICIAN ASSISTANT

## 2020-01-01 PROCEDURE — 1200000000 HC SEMI PRIVATE

## 2020-01-01 PROCEDURE — 6360000002 HC RX W HCPCS: Performed by: PHYSICIAN ASSISTANT

## 2020-01-01 PROCEDURE — 2580000003 HC RX 258: Performed by: FAMILY MEDICINE

## 2020-01-01 PROCEDURE — 93005 ELECTROCARDIOGRAM TRACING: CPT | Performed by: STUDENT IN AN ORGANIZED HEALTH CARE EDUCATION/TRAINING PROGRAM

## 2020-01-01 PROCEDURE — 94760 N-INVAS EAR/PLS OXIMETRY 1: CPT

## 2020-01-01 PROCEDURE — APPSS60 APP SPLIT SHARED TIME 46-60 MINUTES: Performed by: NURSE PRACTITIONER

## 2020-01-01 PROCEDURE — 6370000000 HC RX 637 (ALT 250 FOR IP): Performed by: PHYSICIAN ASSISTANT

## 2020-01-01 PROCEDURE — 86923 COMPATIBILITY TEST ELECTRIC: CPT

## 2020-01-01 PROCEDURE — 99284 EMERGENCY DEPT VISIT MOD MDM: CPT

## 2020-01-01 PROCEDURE — 99222 1ST HOSP IP/OBS MODERATE 55: CPT | Performed by: SURGERY

## 2020-01-01 PROCEDURE — 36415 COLL VENOUS BLD VENIPUNCTURE: CPT

## 2020-01-01 PROCEDURE — 0QS736Z REPOSITION LEFT UPPER FEMUR WITH INTRAMEDULLARY INTERNAL FIXATION DEVICE, PERCUTANEOUS APPROACH: ICD-10-PCS | Performed by: ORTHOPAEDIC SURGERY

## 2020-01-01 PROCEDURE — 2500000003 HC RX 250 WO HCPCS: Performed by: ANESTHESIOLOGY

## 2020-01-01 PROCEDURE — P9016 RBC LEUKOCYTES REDUCED: HCPCS

## 2020-01-01 PROCEDURE — 6370000000 HC RX 637 (ALT 250 FOR IP): Performed by: FAMILY MEDICINE

## 2020-01-01 PROCEDURE — 6360000002 HC RX W HCPCS: Performed by: FAMILY MEDICINE

## 2020-01-01 PROCEDURE — 86900 BLOOD TYPING SEROLOGIC ABO: CPT

## 2020-01-01 PROCEDURE — 96374 THER/PROPH/DIAG INJ IV PUSH: CPT

## 2020-01-01 PROCEDURE — 80048 BASIC METABOLIC PNL TOTAL CA: CPT

## 2020-01-01 PROCEDURE — 2500000003 HC RX 250 WO HCPCS: Performed by: ORTHOPAEDIC SURGERY

## 2020-01-01 PROCEDURE — C1713 ANCHOR/SCREW BN/BN,TIS/BN: HCPCS | Performed by: ORTHOPAEDIC SURGERY

## 2020-01-01 PROCEDURE — 3209999900 FLUORO FOR SURGICAL PROCEDURES

## 2020-01-01 PROCEDURE — 73502 X-RAY EXAM HIP UNI 2-3 VIEWS: CPT

## 2020-01-01 PROCEDURE — 2700000000 HC OXYGEN THERAPY PER DAY

## 2020-01-01 PROCEDURE — 86901 BLOOD TYPING SEROLOGIC RH(D): CPT

## 2020-01-01 PROCEDURE — 85014 HEMATOCRIT: CPT

## 2020-01-01 PROCEDURE — 85025 COMPLETE CBC W/AUTO DIFF WBC: CPT

## 2020-01-01 PROCEDURE — 72125 CT NECK SPINE W/O DYE: CPT

## 2020-01-01 PROCEDURE — 73560 X-RAY EXAM OF KNEE 1 OR 2: CPT

## 2020-01-01 PROCEDURE — 2580000003 HC RX 258: Performed by: PHYSICIAN ASSISTANT

## 2020-01-01 PROCEDURE — APPSS60 APP SPLIT SHARED TIME 46-60 MINUTES: Performed by: PHYSICIAN ASSISTANT

## 2020-01-01 PROCEDURE — 97110 THERAPEUTIC EXERCISES: CPT

## 2020-01-01 PROCEDURE — 99232 SBSQ HOSP IP/OBS MODERATE 35: CPT | Performed by: SURGERY

## 2020-01-01 PROCEDURE — 86850 RBC ANTIBODY SCREEN: CPT

## 2020-01-01 PROCEDURE — 6360000002 HC RX W HCPCS

## 2020-01-01 PROCEDURE — 3600000004 HC SURGERY LEVEL 4 BASE: Performed by: ORTHOPAEDIC SURGERY

## 2020-01-01 PROCEDURE — 80053 COMPREHEN METABOLIC PANEL: CPT

## 2020-01-01 PROCEDURE — 70450 CT HEAD/BRAIN W/O DYE: CPT

## 2020-01-01 PROCEDURE — 2500000003 HC RX 250 WO HCPCS: Performed by: FAMILY MEDICINE

## 2020-01-01 PROCEDURE — 2720000010 HC SURG SUPPLY STERILE: Performed by: ORTHOPAEDIC SURGERY

## 2020-01-01 PROCEDURE — 97530 THERAPEUTIC ACTIVITIES: CPT

## 2020-01-01 PROCEDURE — 85018 HEMOGLOBIN: CPT

## 2020-01-01 PROCEDURE — 6360000002 HC RX W HCPCS: Performed by: ORTHOPAEDIC SURGERY

## 2020-01-01 PROCEDURE — 3700000000 HC ANESTHESIA ATTENDED CARE: Performed by: ORTHOPAEDIC SURGERY

## 2020-01-01 PROCEDURE — APPSS45 APP SPLIT SHARED TIME 31-45 MINUTES: Performed by: PHYSICIAN ASSISTANT

## 2020-01-01 PROCEDURE — 3700000001 HC ADD 15 MINUTES (ANESTHESIA): Performed by: ORTHOPAEDIC SURGERY

## 2020-01-01 PROCEDURE — 7100000000 HC PACU RECOVERY - FIRST 15 MIN: Performed by: ORTHOPAEDIC SURGERY

## 2020-01-01 PROCEDURE — 71045 X-RAY EXAM CHEST 1 VIEW: CPT

## 2020-01-01 PROCEDURE — 36430 TRANSFUSION BLD/BLD COMPNT: CPT

## 2020-01-01 PROCEDURE — 2709999900 HC NON-CHARGEABLE SUPPLY: Performed by: ORTHOPAEDIC SURGERY

## 2020-01-01 PROCEDURE — 3600000014 HC SURGERY LEVEL 4 ADDTL 15MIN: Performed by: ORTHOPAEDIC SURGERY

## 2020-01-01 PROCEDURE — 7100000001 HC PACU RECOVERY - ADDTL 15 MIN: Performed by: ORTHOPAEDIC SURGERY

## 2020-01-01 PROCEDURE — 2W6PX0Z TRACTION OF LEFT UPPER LEG USING TRACTION APPARATUS: ICD-10-PCS | Performed by: ORTHOPAEDIC SURGERY

## 2020-01-01 PROCEDURE — 82550 ASSAY OF CK (CPK): CPT

## 2020-01-01 PROCEDURE — 97163 PT EVAL HIGH COMPLEX 45 MIN: CPT

## 2020-01-01 PROCEDURE — U0002 COVID-19 LAB TEST NON-CDC: HCPCS

## 2020-01-01 PROCEDURE — 2709999900 HC NON-CHARGEABLE SUPPLY

## 2020-01-01 PROCEDURE — 73700 CT LOWER EXTREMITY W/O DYE: CPT

## 2020-01-01 DEVICE — INTERTAN LAG/COMPRESSION SCREW KIT                                    90MM / 85MM
Type: IMPLANTABLE DEVICE | Site: HIP | Status: FUNCTIONAL
Brand: TRIGEN

## 2020-01-01 DEVICE — TRIGEN INTERTAN 11.5MM X 18CM 130DEGREE
Type: IMPLANTABLE DEVICE | Site: HIP | Status: FUNCTIONAL
Brand: TRIGEN

## 2020-01-01 DEVICE — TRIGEN LOW PROFILE SCREW 5.0MM X 35MM
Type: IMPLANTABLE DEVICE | Site: HIP | Status: FUNCTIONAL
Brand: TRIGEN

## 2020-01-01 RX ORDER — MORPHINE SULFATE 2 MG/ML
2 INJECTION, SOLUTION INTRAMUSCULAR; INTRAVENOUS EVERY 4 HOURS PRN
Status: DISCONTINUED | OUTPATIENT
Start: 2020-01-01 | End: 2020-01-01 | Stop reason: HOSPADM

## 2020-01-01 RX ORDER — METOPROLOL TARTRATE 5 MG/5ML
2.5 INJECTION INTRAVENOUS EVERY 8 HOURS
Status: DISCONTINUED | OUTPATIENT
Start: 2020-01-01 | End: 2020-01-01 | Stop reason: HOSPADM

## 2020-01-01 RX ORDER — POTASSIUM CHLORIDE 7.45 MG/ML
10 INJECTION INTRAVENOUS PRN
Status: DISCONTINUED | OUTPATIENT
Start: 2020-01-01 | End: 2020-01-01 | Stop reason: HOSPADM

## 2020-01-01 RX ORDER — POTASSIUM CHLORIDE 7.45 MG/ML
10 INJECTION INTRAVENOUS ONCE
Status: COMPLETED | OUTPATIENT
Start: 2020-01-01 | End: 2020-01-01

## 2020-01-01 RX ORDER — SODIUM CHLORIDE 0.9 % (FLUSH) 0.9 %
10 SYRINGE (ML) INJECTION PRN
Status: DISCONTINUED | OUTPATIENT
Start: 2020-01-01 | End: 2020-01-01 | Stop reason: SDUPTHER

## 2020-01-01 RX ORDER — FENTANYL CITRATE 50 UG/ML
25 INJECTION, SOLUTION INTRAMUSCULAR; INTRAVENOUS
Status: DISCONTINUED | OUTPATIENT
Start: 2020-01-01 | End: 2020-01-01

## 2020-01-01 RX ORDER — FAMOTIDINE 20 MG/1
20 TABLET, FILM COATED ORAL DAILY
COMMUNITY

## 2020-01-01 RX ORDER — ACETAMINOPHEN 650 MG/1
650 SUPPOSITORY RECTAL EVERY 6 HOURS
Status: DISCONTINUED | OUTPATIENT
Start: 2020-01-01 | End: 2020-01-01 | Stop reason: HOSPADM

## 2020-01-01 RX ORDER — ASPIRIN 81 MG/1
81 TABLET ORAL DAILY
Qty: 28 TABLET | Refills: 0 | Status: SHIPPED | OUTPATIENT
Start: 2020-01-01

## 2020-01-01 RX ORDER — FENTANYL CITRATE 50 UG/ML
25 INJECTION, SOLUTION INTRAMUSCULAR; INTRAVENOUS ONCE
Status: COMPLETED | OUTPATIENT
Start: 2020-01-01 | End: 2020-01-01

## 2020-01-01 RX ORDER — ONDANSETRON 2 MG/ML
4 INJECTION INTRAMUSCULAR; INTRAVENOUS EVERY 6 HOURS PRN
Status: DISCONTINUED | OUTPATIENT
Start: 2020-01-01 | End: 2020-01-01 | Stop reason: HOSPADM

## 2020-01-01 RX ORDER — ACETAMINOPHEN 325 MG/1
650 TABLET ORAL EVERY 4 HOURS PRN
Status: DISCONTINUED | OUTPATIENT
Start: 2020-01-01 | End: 2020-01-01

## 2020-01-01 RX ORDER — BUPIVACAINE HYDROCHLORIDE AND EPINEPHRINE 5; 5 MG/ML; UG/ML
INJECTION, SOLUTION EPIDURAL; INTRACAUDAL; PERINEURAL PRN
Status: DISCONTINUED | OUTPATIENT
Start: 2020-01-01 | End: 2020-01-01 | Stop reason: HOSPADM

## 2020-01-01 RX ORDER — EPHEDRINE SULFATE/0.9% NACL/PF 50 MG/5 ML
SYRINGE (ML) INTRAVENOUS PRN
Status: DISCONTINUED | OUTPATIENT
Start: 2020-01-01 | End: 2020-01-01 | Stop reason: SDUPTHER

## 2020-01-01 RX ORDER — SODIUM CHLORIDE 0.9 % (FLUSH) 0.9 %
10 SYRINGE (ML) INJECTION PRN
Status: DISCONTINUED | OUTPATIENT
Start: 2020-01-01 | End: 2020-01-01 | Stop reason: HOSPADM

## 2020-01-01 RX ORDER — SODIUM CHLORIDE 0.9 % (FLUSH) 0.9 %
10 SYRINGE (ML) INJECTION EVERY 12 HOURS SCHEDULED
Status: DISCONTINUED | OUTPATIENT
Start: 2020-01-01 | End: 2020-01-01 | Stop reason: SDUPTHER

## 2020-01-01 RX ORDER — CEFAZOLIN SODIUM 1 G/50ML
1 INJECTION, SOLUTION INTRAVENOUS EVERY 8 HOURS
Status: COMPLETED | OUTPATIENT
Start: 2020-01-01 | End: 2020-01-01

## 2020-01-01 RX ORDER — SODIUM CHLORIDE 0.9 % (FLUSH) 0.9 %
10 SYRINGE (ML) INJECTION EVERY 12 HOURS SCHEDULED
Status: DISCONTINUED | OUTPATIENT
Start: 2020-01-01 | End: 2020-01-01 | Stop reason: HOSPADM

## 2020-01-01 RX ORDER — CIPROFLOXACIN HYDROCHLORIDE 3.5 MG/ML
1 SOLUTION/ DROPS TOPICAL 4 TIMES DAILY
Status: DISCONTINUED | OUTPATIENT
Start: 2020-01-01 | End: 2020-01-01 | Stop reason: HOSPADM

## 2020-01-01 RX ORDER — CIPROFLOXACIN HYDROCHLORIDE 3.5 MG/ML
1 SOLUTION/ DROPS TOPICAL 4 TIMES DAILY
Qty: 40 DROP | Refills: 0 | DISCHARGE
Start: 2020-01-01 | End: 2020-01-01

## 2020-01-01 RX ORDER — ORPHENADRINE CITRATE 30 MG/ML
60 INJECTION INTRAMUSCULAR; INTRAVENOUS EVERY 12 HOURS
Status: DISCONTINUED | OUTPATIENT
Start: 2020-01-01 | End: 2020-01-01

## 2020-01-01 RX ORDER — ACETAMINOPHEN 650 MG/1
650 SUPPOSITORY RECTAL EVERY 4 HOURS PRN
Status: DISCONTINUED | OUTPATIENT
Start: 2020-01-01 | End: 2020-01-01

## 2020-01-01 RX ORDER — ACETAMINOPHEN 325 MG/1
650 TABLET ORAL EVERY 4 HOURS PRN
Status: DISCONTINUED | OUTPATIENT
Start: 2020-01-01 | End: 2020-01-01 | Stop reason: HOSPADM

## 2020-01-01 RX ORDER — SODIUM PHOSPHATE, DIBASIC AND SODIUM PHOSPHATE, MONOBASIC 7; 19 G/133ML; G/133ML
1 ENEMA RECTAL DAILY PRN
Status: DISCONTINUED | OUTPATIENT
Start: 2020-01-01 | End: 2020-01-01 | Stop reason: HOSPADM

## 2020-01-01 RX ORDER — LIDOCAINE HCL/PF 100 MG/5ML
SYRINGE (ML) INJECTION PRN
Status: DISCONTINUED | OUTPATIENT
Start: 2020-01-01 | End: 2020-01-01 | Stop reason: SDUPTHER

## 2020-01-01 RX ORDER — FENTANYL CITRATE 50 UG/ML
INJECTION, SOLUTION INTRAMUSCULAR; INTRAVENOUS
Status: COMPLETED
Start: 2020-01-01 | End: 2020-01-01

## 2020-01-01 RX ORDER — METOPROLOL TARTRATE 5 MG/5ML
5 INJECTION INTRAVENOUS ONCE
Status: COMPLETED | OUTPATIENT
Start: 2020-01-01 | End: 2020-01-01

## 2020-01-01 RX ORDER — BISACODYL 10 MG
10 SUPPOSITORY, RECTAL RECTAL DAILY PRN
Status: DISCONTINUED | OUTPATIENT
Start: 2020-01-01 | End: 2020-01-01 | Stop reason: HOSPADM

## 2020-01-01 RX ORDER — POLYETHYLENE GLYCOL 3350 17 G/17G
17 POWDER, FOR SOLUTION ORAL DAILY
Status: DISCONTINUED | OUTPATIENT
Start: 2020-01-01 | End: 2020-01-01 | Stop reason: HOSPADM

## 2020-01-01 RX ORDER — LOPERAMIDE HYDROCHLORIDE 2 MG/1
2 CAPSULE ORAL 4 TIMES DAILY PRN
COMMUNITY

## 2020-01-01 RX ORDER — POLYETHYLENE GLYCOL 3350 17 G/17G
17 POWDER, FOR SOLUTION ORAL DAILY PRN
Status: DISCONTINUED | OUTPATIENT
Start: 2020-01-01 | End: 2020-01-01 | Stop reason: HOSPADM

## 2020-01-01 RX ORDER — KETOROLAC TROMETHAMINE 30 MG/ML
15 INJECTION, SOLUTION INTRAMUSCULAR; INTRAVENOUS EVERY 6 HOURS PRN
Status: DISCONTINUED | OUTPATIENT
Start: 2020-01-01 | End: 2020-01-01

## 2020-01-01 RX ORDER — PROPOFOL 10 MG/ML
INJECTION, EMULSION INTRAVENOUS PRN
Status: DISCONTINUED | OUTPATIENT
Start: 2020-01-01 | End: 2020-01-01 | Stop reason: SDUPTHER

## 2020-01-01 RX ORDER — FENTANYL CITRATE 50 UG/ML
50 INJECTION, SOLUTION INTRAMUSCULAR; INTRAVENOUS
Status: DISCONTINUED | OUTPATIENT
Start: 2020-01-01 | End: 2020-01-01

## 2020-01-01 RX ORDER — CHOLECALCIFEROL (VITAMIN D3) 125 MCG
125 CAPSULE ORAL DAILY
Qty: 84 CAPSULE | Refills: 0 | Status: SHIPPED | OUTPATIENT
Start: 2020-01-01

## 2020-01-01 RX ORDER — PROMETHAZINE HYDROCHLORIDE 25 MG/1
12.5 TABLET ORAL EVERY 6 HOURS PRN
Status: DISCONTINUED | OUTPATIENT
Start: 2020-01-01 | End: 2020-01-01 | Stop reason: HOSPADM

## 2020-01-01 RX ORDER — KETOROLAC TROMETHAMINE 30 MG/ML
15 INJECTION, SOLUTION INTRAMUSCULAR; INTRAVENOUS ONCE
Status: COMPLETED | OUTPATIENT
Start: 2020-01-01 | End: 2020-01-01

## 2020-01-01 RX ORDER — SODIUM CHLORIDE 9 MG/ML
INJECTION, SOLUTION INTRAVENOUS CONTINUOUS
Status: DISCONTINUED | OUTPATIENT
Start: 2020-01-01 | End: 2020-01-01 | Stop reason: HOSPADM

## 2020-01-01 RX ORDER — ASPIRIN 81 MG/1
81 TABLET ORAL DAILY
Status: DISCONTINUED | OUTPATIENT
Start: 2020-01-01 | End: 2020-01-01 | Stop reason: HOSPADM

## 2020-01-01 RX ORDER — FUROSEMIDE 10 MG/ML
20 INJECTION INTRAMUSCULAR; INTRAVENOUS ONCE
Status: COMPLETED | OUTPATIENT
Start: 2020-01-01 | End: 2020-01-01

## 2020-01-01 RX ORDER — ORPHENADRINE CITRATE 30 MG/ML
60 INJECTION INTRAMUSCULAR; INTRAVENOUS ONCE
Status: COMPLETED | OUTPATIENT
Start: 2020-01-01 | End: 2020-01-01

## 2020-01-01 RX ORDER — FENTANYL CITRATE 50 UG/ML
INJECTION, SOLUTION INTRAMUSCULAR; INTRAVENOUS PRN
Status: DISCONTINUED | OUTPATIENT
Start: 2020-01-01 | End: 2020-01-01 | Stop reason: SDUPTHER

## 2020-01-01 RX ORDER — CEFAZOLIN SODIUM 1 G/3ML
INJECTION, POWDER, FOR SOLUTION INTRAMUSCULAR; INTRAVENOUS PRN
Status: DISCONTINUED | OUTPATIENT
Start: 2020-01-01 | End: 2020-01-01 | Stop reason: SDUPTHER

## 2020-01-01 RX ORDER — MORPHINE SULFATE 2 MG/ML
1 INJECTION, SOLUTION INTRAMUSCULAR; INTRAVENOUS EVERY 4 HOURS PRN
Status: DISCONTINUED | OUTPATIENT
Start: 2020-01-01 | End: 2020-01-01 | Stop reason: HOSPADM

## 2020-01-01 RX ORDER — 0.9 % SODIUM CHLORIDE 0.9 %
20 INTRAVENOUS SOLUTION INTRAVENOUS ONCE
Status: COMPLETED | OUTPATIENT
Start: 2020-01-01 | End: 2020-01-01

## 2020-01-01 RX ADMIN — ORPHENADRINE CITRATE 60 MG: 30 INJECTION INTRAMUSCULAR; INTRAVENOUS at 04:20

## 2020-01-01 RX ADMIN — SODIUM CHLORIDE: 9 INJECTION, SOLUTION INTRAVENOUS at 05:06

## 2020-01-01 RX ADMIN — ACETAMINOPHEN 650 MG: 650 SUPPOSITORY RECTAL at 09:19

## 2020-01-01 RX ADMIN — METOPROLOL TARTRATE 2.5 MG: 5 INJECTION INTRAVENOUS at 09:24

## 2020-01-01 RX ADMIN — METOPROLOL TARTRATE 2.5 MG: 5 INJECTION INTRAVENOUS at 15:33

## 2020-01-01 RX ADMIN — ACETAMINOPHEN 650 MG: 650 SUPPOSITORY RECTAL at 04:00

## 2020-01-01 RX ADMIN — METOPROLOL TARTRATE 2.5 MG: 5 INJECTION INTRAVENOUS at 08:59

## 2020-01-01 RX ADMIN — FENTANYL CITRATE 50 MCG: 50 INJECTION, SOLUTION INTRAMUSCULAR; INTRAVENOUS at 01:47

## 2020-01-01 RX ADMIN — MORPHINE SULFATE 1 MG: 2 INJECTION, SOLUTION INTRAMUSCULAR; INTRAVENOUS at 23:45

## 2020-01-01 RX ADMIN — MORPHINE SULFATE 2 MG: 2 INJECTION, SOLUTION INTRAMUSCULAR; INTRAVENOUS at 03:45

## 2020-01-01 RX ADMIN — MORPHINE SULFATE 1 MG: 2 INJECTION, SOLUTION INTRAMUSCULAR; INTRAVENOUS at 18:31

## 2020-01-01 RX ADMIN — FAMOTIDINE 20 MG: 10 INJECTION, SOLUTION INTRAVENOUS at 08:45

## 2020-01-01 RX ADMIN — FAMOTIDINE 20 MG: 10 INJECTION, SOLUTION INTRAVENOUS at 09:01

## 2020-01-01 RX ADMIN — CEFAZOLIN SODIUM 1 G: 1 INJECTION, SOLUTION INTRAVENOUS at 05:06

## 2020-01-01 RX ADMIN — ASPIRIN 81 MG: 81 TABLET ORAL at 10:04

## 2020-01-01 RX ADMIN — SODIUM CHLORIDE: 9 INJECTION, SOLUTION INTRAVENOUS at 15:33

## 2020-01-01 RX ADMIN — METOPROLOL TARTRATE 2.5 MG: 5 INJECTION INTRAVENOUS at 00:42

## 2020-01-01 RX ADMIN — CIPROFLOXACIN 1 DROP: 3 SOLUTION OPHTHALMIC at 20:30

## 2020-01-01 RX ADMIN — POLYETHYLENE GLYCOL 3350 17 G: 17 POWDER, FOR SOLUTION ORAL at 10:04

## 2020-01-01 RX ADMIN — MORPHINE SULFATE 1 MG: 2 INJECTION, SOLUTION INTRAMUSCULAR; INTRAVENOUS at 09:01

## 2020-01-01 RX ADMIN — FENTANYL CITRATE 25 MCG: 50 INJECTION, SOLUTION INTRAMUSCULAR; INTRAVENOUS at 20:46

## 2020-01-01 RX ADMIN — CIPROFLOXACIN 1 DROP: 3 SOLUTION OPHTHALMIC at 21:49

## 2020-01-01 RX ADMIN — POLYETHYLENE GLYCOL 3350 17 G: 17 POWDER, FOR SOLUTION ORAL at 08:46

## 2020-01-01 RX ADMIN — CIPROFLOXACIN 1 DROP: 3 SOLUTION OPHTHALMIC at 08:45

## 2020-01-01 RX ADMIN — Medication 20 MG: at 19:13

## 2020-01-01 RX ADMIN — HYDROMORPHONE HYDROCHLORIDE 0.5 MG: 1 INJECTION, SOLUTION INTRAMUSCULAR; INTRAVENOUS; SUBCUTANEOUS at 21:46

## 2020-01-01 RX ADMIN — CIPROFLOXACIN 1 DROP: 3 SOLUTION OPHTHALMIC at 09:57

## 2020-01-01 RX ADMIN — FENTANYL CITRATE 25 MCG: 50 INJECTION, SOLUTION INTRAMUSCULAR; INTRAVENOUS at 16:51

## 2020-01-01 RX ADMIN — MORPHINE SULFATE 2 MG: 2 INJECTION, SOLUTION INTRAMUSCULAR; INTRAVENOUS at 14:30

## 2020-01-01 RX ADMIN — ORPHENADRINE CITRATE 60 MG: 30 INJECTION INTRAMUSCULAR; INTRAVENOUS at 03:09

## 2020-01-01 RX ADMIN — FENTANYL CITRATE 50 MCG: 50 INJECTION, SOLUTION INTRAMUSCULAR; INTRAVENOUS at 23:05

## 2020-01-01 RX ADMIN — KETOROLAC TROMETHAMINE 15 MG: 30 INJECTION, SOLUTION INTRAMUSCULAR at 04:19

## 2020-01-01 RX ADMIN — METOPROLOL TARTRATE 2.5 MG: 5 INJECTION INTRAVENOUS at 15:00

## 2020-01-01 RX ADMIN — POTASSIUM CHLORIDE 10 MEQ: 7.46 INJECTION, SOLUTION INTRAVENOUS at 12:23

## 2020-01-01 RX ADMIN — METOPROLOL TARTRATE 2.5 MG: 5 INJECTION INTRAVENOUS at 09:39

## 2020-01-01 RX ADMIN — MORPHINE SULFATE 2 MG: 2 INJECTION, SOLUTION INTRAMUSCULAR; INTRAVENOUS at 00:50

## 2020-01-01 RX ADMIN — CEFAZOLIN SODIUM 1 G: 1 INJECTION, SOLUTION INTRAVENOUS at 11:38

## 2020-01-01 RX ADMIN — FENTANYL CITRATE 25 MCG: 50 INJECTION, SOLUTION INTRAMUSCULAR; INTRAVENOUS at 21:09

## 2020-01-01 RX ADMIN — CEFAZOLIN 2000 MG: 1 INJECTION, POWDER, FOR SOLUTION INTRAMUSCULAR; INTRAVENOUS; PARENTERAL at 19:16

## 2020-01-01 RX ADMIN — SODIUM CHLORIDE: 9 INJECTION, SOLUTION INTRAVENOUS at 14:56

## 2020-01-01 RX ADMIN — CIPROFLOXACIN 1 DROP: 3 SOLUTION OPHTHALMIC at 17:55

## 2020-01-01 RX ADMIN — FAMOTIDINE 20 MG: 10 INJECTION, SOLUTION INTRAVENOUS at 09:28

## 2020-01-01 RX ADMIN — MORPHINE SULFATE 1 MG: 2 INJECTION, SOLUTION INTRAMUSCULAR; INTRAVENOUS at 20:36

## 2020-01-01 RX ADMIN — METOPROLOL TARTRATE 5 MG: 5 INJECTION INTRAVENOUS at 01:38

## 2020-01-01 RX ADMIN — Medication 10 ML: at 00:07

## 2020-01-01 RX ADMIN — METOPROLOL TARTRATE 2.5 MG: 5 INJECTION INTRAVENOUS at 15:32

## 2020-01-01 RX ADMIN — SODIUM CHLORIDE: 9 INJECTION, SOLUTION INTRAVENOUS at 17:56

## 2020-01-01 RX ADMIN — FUROSEMIDE 20 MG: 10 INJECTION, SOLUTION INTRAMUSCULAR; INTRAVENOUS at 14:48

## 2020-01-01 RX ADMIN — Medication 10 ML: at 09:29

## 2020-01-01 RX ADMIN — CIPROFLOXACIN 1 DROP: 3 SOLUTION OPHTHALMIC at 20:10

## 2020-01-01 RX ADMIN — CIPROFLOXACIN 1 DROP: 3 SOLUTION OPHTHALMIC at 12:29

## 2020-01-01 RX ADMIN — MORPHINE SULFATE 1 MG: 2 INJECTION, SOLUTION INTRAMUSCULAR; INTRAVENOUS at 10:38

## 2020-01-01 RX ADMIN — Medication 10 MG: at 19:16

## 2020-01-01 RX ADMIN — FAMOTIDINE 20 MG: 10 INJECTION, SOLUTION INTRAVENOUS at 10:04

## 2020-01-01 RX ADMIN — FENTANYL CITRATE 25 MCG: 50 INJECTION, SOLUTION INTRAMUSCULAR; INTRAVENOUS at 19:57

## 2020-01-01 RX ADMIN — SODIUM CHLORIDE: 9 INJECTION, SOLUTION INTRAVENOUS at 00:07

## 2020-01-01 RX ADMIN — CIPROFLOXACIN 1 DROP: 3 SOLUTION OPHTHALMIC at 08:59

## 2020-01-01 RX ADMIN — ACETAMINOPHEN 650 MG: 650 SUPPOSITORY RECTAL at 23:23

## 2020-01-01 RX ADMIN — SODIUM CHLORIDE: 9 INJECTION, SOLUTION INTRAVENOUS at 06:14

## 2020-01-01 RX ADMIN — SODIUM CHLORIDE 20 ML: 9 INJECTION, SOLUTION INTRAVENOUS at 10:55

## 2020-01-01 RX ADMIN — METOPROLOL TARTRATE 2.5 MG: 5 INJECTION INTRAVENOUS at 08:45

## 2020-01-01 RX ADMIN — METOPROLOL TARTRATE 2.5 MG: 5 INJECTION INTRAVENOUS at 23:32

## 2020-01-01 RX ADMIN — POTASSIUM CHLORIDE 10 MEQ: 7.46 INJECTION, SOLUTION INTRAVENOUS at 08:45

## 2020-01-01 RX ADMIN — PROPOFOL 50 MG: 10 INJECTION, EMULSION INTRAVENOUS at 19:13

## 2020-01-01 RX ADMIN — FENTANYL CITRATE 25 MCG: 50 INJECTION, SOLUTION INTRAMUSCULAR; INTRAVENOUS at 15:31

## 2020-01-01 ASSESSMENT — PAIN SCALES - GENERAL
PAINLEVEL_OUTOF10: 9
PAINLEVEL_OUTOF10: 8
PAINLEVEL_OUTOF10: 7
PAINLEVEL_OUTOF10: 5
PAINLEVEL_OUTOF10: 10
PAINLEVEL_OUTOF10: 4
PAINLEVEL_OUTOF10: 0
PAINLEVEL_OUTOF10: 6
PAINLEVEL_OUTOF10: 4
PAINLEVEL_OUTOF10: 5
PAINLEVEL_OUTOF10: 2
PAINLEVEL_OUTOF10: 6
PAINLEVEL_OUTOF10: 4
PAINLEVEL_OUTOF10: 4
PAINLEVEL_OUTOF10: 7
PAINLEVEL_OUTOF10: 7

## 2020-01-01 ASSESSMENT — PULMONARY FUNCTION TESTS
PIF_VALUE: 17
PIF_VALUE: 2
PIF_VALUE: 24
PIF_VALUE: 18
PIF_VALUE: 10
PIF_VALUE: 18
PIF_VALUE: 17
PIF_VALUE: 18
PIF_VALUE: 18
PIF_VALUE: 17
PIF_VALUE: 6
PIF_VALUE: 17
PIF_VALUE: 18
PIF_VALUE: 18
PIF_VALUE: 17
PIF_VALUE: 18
PIF_VALUE: 18
PIF_VALUE: 1
PIF_VALUE: 18
PIF_VALUE: 17
PIF_VALUE: 10
PIF_VALUE: 17
PIF_VALUE: 17
PIF_VALUE: 2
PIF_VALUE: 2
PIF_VALUE: 18
PIF_VALUE: 17
PIF_VALUE: 18
PIF_VALUE: 17
PIF_VALUE: 6
PIF_VALUE: 18
PIF_VALUE: 17
PIF_VALUE: 19
PIF_VALUE: 1
PIF_VALUE: 1
PIF_VALUE: 18
PIF_VALUE: 2
PIF_VALUE: 3
PIF_VALUE: 18
PIF_VALUE: 22
PIF_VALUE: 17
PIF_VALUE: 18
PIF_VALUE: 17
PIF_VALUE: 17
PIF_VALUE: 5
PIF_VALUE: 18
PIF_VALUE: 14
PIF_VALUE: 17
PIF_VALUE: 18
PIF_VALUE: 18
PIF_VALUE: 17
PIF_VALUE: 18
PIF_VALUE: 18
PIF_VALUE: 2
PIF_VALUE: 18
PIF_VALUE: 17
PIF_VALUE: 2
PIF_VALUE: 17
PIF_VALUE: 18
PIF_VALUE: 10
PIF_VALUE: 18
PIF_VALUE: 18
PIF_VALUE: 3
PIF_VALUE: 3
PIF_VALUE: 4
PIF_VALUE: 3
PIF_VALUE: 1
PIF_VALUE: 17
PIF_VALUE: 18
PIF_VALUE: 18
PIF_VALUE: 15
PIF_VALUE: 18
PIF_VALUE: 18

## 2020-01-01 ASSESSMENT — PAIN SCALES - PAIN ASSESSMENT IN ADVANCED DEMENTIA (PAINAD)
NEGVOCALIZATION: 2
BREATHING: 1
FACIALEXPRESSION: 1
BREATHING: 0
BODYLANGUAGE: 1
NEGVOCALIZATION: 1
BODYLANGUAGE: 0
BODYLANGUAGE: 1
TOTALSCORE: 4
NEGVOCALIZATION: 1
TOTALSCORE: 8
CONSOLABILITY: 1
NEGVOCALIZATION: 1
BREATHING: 1
BODYLANGUAGE: 1
CONSOLABILITY: 1
TOTALSCORE: 4
FACIALEXPRESSION: 1
NEGVOCALIZATION: 2
BODYLANGUAGE: 2
FACIALEXPRESSION: 1
NEGVOCALIZATION: 1
TOTALSCORE: 3
FACIALEXPRESSION: 1
FACIALEXPRESSION: 1
CONSOLABILITY: 1
CONSOLABILITY: 2
CONSOLABILITY: 2
FACIALEXPRESSION: 1
FACIALEXPRESSION: 1
TOTALSCORE: 8
TOTALSCORE: 7
CONSOLABILITY: 2
TOTALSCORE: 5
CONSOLABILITY: 1
CONSOLABILITY: 1
FACIALEXPRESSION: 1
NEGVOCALIZATION: 1
NEGVOCALIZATION: 1
TOTALSCORE: 5
NEGVOCALIZATION: 1
FACIALEXPRESSION: 1
BODYLANGUAGE: 2
BODYLANGUAGE: 0
TOTALSCORE: 5
BREATHING: 1
FACIALEXPRESSION: 1
BREATHING: 1
BODYLANGUAGE: 1
BREATHING: 0
BREATHING: 1
NEGVOCALIZATION: 1
FACIALEXPRESSION: 1
NEGVOCALIZATION: 2
CONSOLABILITY: 2
FACIALEXPRESSION: 1
TOTALSCORE: 8
TOTALSCORE: 6
BODYLANGUAGE: 2
BREATHING: 1
NEGVOCALIZATION: 2
NEGVOCALIZATION: 1
BREATHING: 1
BODYLANGUAGE: 1
NEGVOCALIZATION: 1
BODYLANGUAGE: 2
FACIALEXPRESSION: 2
TOTALSCORE: 9
NEGVOCALIZATION: 1
BREATHING: 1
BREATHING: 0
CONSOLABILITY: 2
CONSOLABILITY: 0
TOTALSCORE: 4
TOTALSCORE: 5
TOTALSCORE: 8
BODYLANGUAGE: 1
BODYLANGUAGE: 2
BODYLANGUAGE: 2
FACIALEXPRESSION: 1
BREATHING: 0
CONSOLABILITY: 1
BREATHING: 0
CONSOLABILITY: 1
BREATHING: 1
FACIALEXPRESSION: 1
CONSOLABILITY: 2
BODYLANGUAGE: 1
BREATHING: 1
TOTALSCORE: 2
CONSOLABILITY: 2
NEGVOCALIZATION: 2
BREATHING: 1
CONSOLABILITY: 1
FACIALEXPRESSION: 1
BODYLANGUAGE: 1

## 2020-01-01 ASSESSMENT — PAIN DESCRIPTION - FREQUENCY: FREQUENCY: CONTINUOUS

## 2020-01-01 ASSESSMENT — PAIN DESCRIPTION - ORIENTATION
ORIENTATION: LEFT

## 2020-01-01 ASSESSMENT — PATIENT HEALTH QUESTIONNAIRE - PHQ9
SUM OF ALL RESPONSES TO PHQ QUESTIONS 1-9: 0
1. LITTLE INTEREST OR PLEASURE IN DOING THINGS: 0
2. FEELING DOWN, DEPRESSED OR HOPELESS: 0
SUM OF ALL RESPONSES TO PHQ QUESTIONS 1-9: 0
SUM OF ALL RESPONSES TO PHQ9 QUESTIONS 1 & 2: 0

## 2020-01-01 ASSESSMENT — PAIN DESCRIPTION - PROGRESSION: CLINICAL_PROGRESSION: GRADUALLY WORSENING

## 2020-01-01 ASSESSMENT — PAIN DESCRIPTION - DESCRIPTORS: DESCRIPTORS: PATIENT UNABLE TO DESCRIBE

## 2020-01-01 ASSESSMENT — PAIN DESCRIPTION - LOCATION
LOCATION: HIP
LOCATION: OTHER (COMMENT)

## 2020-01-01 ASSESSMENT — ENCOUNTER SYMPTOMS
SINUS PAIN: 0
BACK PAIN: 0
ABDOMINAL PAIN: 0
ORTHOPNEA: 0
EYE PAIN: 0

## 2020-01-01 ASSESSMENT — PAIN DESCRIPTION - PAIN TYPE: TYPE: ACUTE PAIN

## 2020-01-01 ASSESSMENT — PAIN DESCRIPTION - DIRECTION: RADIATING_TOWARDS: UTA

## 2020-01-01 ASSESSMENT — PAIN SCALES - WONG BAKER
WONGBAKER_NUMERICALRESPONSE: 8
WONGBAKER_NUMERICALRESPONSE: 6

## 2020-01-01 ASSESSMENT — PAIN - FUNCTIONAL ASSESSMENT: PAIN_FUNCTIONAL_ASSESSMENT: PREVENTS OR INTERFERES WITH MANY ACTIVE NOT PASSIVE ACTIVITIES

## 2020-01-01 ASSESSMENT — PAIN DESCRIPTION - ONSET: ONSET: UNABLE TO ASSESS

## 2020-02-03 NOTE — TELEPHONE ENCOUNTER
----- Message from Kelly Caputo MD sent at 2/2/2020 11:39 AM EST -----  Call [facility] and repeat tsh and free T4 and  hgb/hct  As do 4-1-20

## 2020-04-22 NOTE — PROGRESS NOTES
Subjective:      Patient ID: Amy Morales is a 80 y.o. female. Patient verified Video Chat was not encrypted, and agrees to the Video Exam in presence of nurse. Patient  Is a  Nursing  Home  Patient      Abdominal  hernia  Stable     chronic peripheral edema     Alzheimer dementia noted and stable    GERD noted be stable    Diffuse  Osteoarthritis no change    Hypertension   This is a chronic problem. The current episode started more than 1 year ago. The problem has been resolved since onset. The problem is controlled. Pertinent negatives include no chest pain, headaches, orthopnea, palpitations or peripheral edema. The current treatment provides significant improvement. There are no compliance problems. patient  Stable     Review of Systems   Constitutional: Negative for activity change and appetite change. HENT: Positive for hearing loss. Cardiovascular: Positive for leg swelling. Negative for chest pain, palpitations and orthopnea. Musculoskeletal: Positive for arthralgias. Neurological: Negative for light-headedness and headaches. Psychiatric/Behavioral:         Confusion and  repitition       Objective:   Physical Exam  Constitutional:       Appearance: Normal appearance. HENT:      Ears:      Comments: Hard of hearing noted in loss of hearing aide  Musculoskeletal:      Right lower leg: Edema present. Left lower leg: Edema present. Comments: Diffuse  osteoarthritis  Of  Knees   Neurological:      General: No focal deficit present. Mental Status: She is alert. Motor: Weakness present. Gait: Gait abnormal.         Assessment:       Diagnosis Orders   1. Essential hypertension     2. Venous stasis of lower extremity     3. Sensorineural hearing loss (SNHL) of both ears     4. Osteoarthritis, unspecified osteoarthritis type, unspecified site     5. Hypothyroidism (acquired)     6. Ventral hernia without obstruction or gangrene     7.  Gastroesophageal reflux Bottle 10     No current facility-administered medications for this visit. No orders of the defined types were placed in this encounter. no labs are needed . Overall stable and recent lab stable. Constant request of her  Back to her senior [facility] apartment but she needs full care.   Unsure  If  Has  POA    Stable and see in one month and  No labs  As done in  January 2020  Dusty Barrett MD

## 2020-07-20 PROBLEM — S72.002A CLOSED LEFT HIP FRACTURE, INITIAL ENCOUNTER (HCC): Status: ACTIVE | Noted: 2020-01-01

## 2020-07-20 NOTE — ED NOTES
Assumed pt care at this time. Pt resting in bed. Resp regular. Pt denies needs. Call light in reach.       Lula Villalobos RN  07/20/20 1945

## 2020-07-20 NOTE — ED NOTES
Bed: 004A  Expected date: 7/20/20  Expected time: 3:34 PM  Means of arrival: [facility]  Comments:     Holly Duque RN  07/20/20 3740

## 2020-07-20 NOTE — ED PROVIDER NOTES
[address] MEDICINE ATTENDING ATTESTATION      Evaluation of Piper Maguire. Case discussed and care plan developed with resident physician. I agree with the note and plan as documented by him, except if my documentation differs. Patient seen and examined by me. I reviewed the medical, surgical, family and social history, medications and allergies. I have reviewed the nursing documentation. I have reviewed the patient's vital signs and are abnormal from Systolic hypertension per my interpretation. Pulsoxymetry is normal per my interpretation. Brief H&P   Patient plan by EMS after nursing staff on her on the floor. Patient was last seen approximately 2 hours earlier so fall was not witnessed. Patient is very hard of hearing and unable to give reliable history. There is a current DNR CC order on the nursing home papers. Physical exam is notable for chronically ill appearing, with c-collar in place, bilateral leg edema, left lower extremity shortened, externally rotated. Normal pulses. Medical Decision Making   MDM: Fall, unclear if syncopal or non-syncopal.  Likely left hip fracture. Patient is DNR CC. Plan: I V line, labs, imaging, observation. Will admit. Please see the resident physician completed note for final disposition except as documented on this attestation. Diagnosis, treatment and disposition plans were discussed and agreed upon by patient. This transcription was electronically signed. It was dictated by use of voice recognition software and electronically transcribed. The transcription may contain errors not detected in proofreading.        Electronically signed by Maty Torres MD on 7/20/20 at 6:55 PM EDT       Maty Torres MD  07/20/20 5913

## 2020-07-20 NOTE — LETTER
Beneficiary Notification Letter  [employer]     Your Doctor or [address],    We wanted to let you know that your health care provider, Bernard Mirza, has volunteered to take part in our Centers for Medicare & Medicaid Services (CMS) Bundled Payments for [facility] ([employer]). This doesnt change your Medicare rights or benefits and you dont need to do anything. What are bundled payments? A bundled payment combines, or bundles together, payments that Medicare makes to your health care providers for the many different kinds of medical services you might get in a specific time period. In [employer], this time period could include a hospital inpatient stay or outpatient procedure, plus 90 days. Why would Medicare bundle payments? Bundled payments are thought of as a value-based way to pay because health care providers are responsible for both the quality and cost of medical care they give. This is a relatively new way of paying health care providers compared to thefee-for-service way Medicare has traditionally paid, where providers are paid separately for each service they provide. Bundled payments encourage these providers to work together to provide better, more coordinated care during your hospital stay, or outpatient procedure, and through your recovery. What does BPCI Advance mean for me? Youre more likely to get even better care when hospitals, doctors, and other health care providers work together. In [employer], hospitals, doctors, and other health care providers may be rewarded for providing better, more coordinated health care. Medicare will watch BPCI Advanced participants closely to make sure that you and other patients keep getting efficient, high quality care. What do I need to know about [employer]? included under [employer]. A Clinical Episode is a grouping of medical conditions or diagnoses that are included in the [address] [facility].

## 2020-07-20 NOTE — ED NOTES
Pt to the ED via [facility]. [facility] states that patient was at [facility] when they arrived [address] was on scene. [facility] states that patient had been laying on the ground in pain for two hours prior to arrival to [facility]. Patient is moaning and grimacing when stimulated. Patient VSS. Patient Sp02 was 58% on room air and place on 10L Non-re breather, now sating at 100%. Patient currently on back board. Patient given call light and provided warm blankets.  Side rails up x2          Ezequiel Smith RN  07/20/20 8231

## 2020-07-20 NOTE — ED PROVIDER NOTES
[facility]          Pt Name: Lori Mari  MRN: [id number]  [place] 2/3/1921  Date of evaluation: 7/20/2020  Treating Resident Physician: Isreal Garcia MD  Supervising Physician: Dr. Shea Augustine       Chief Complaint   Patient presents with    Hip Pain     History obtained from unobtainable from patient due to age & loss of hearing. HISTORY OF PRESENT ILLNESS    HPI  Lori Mari is a 80 y.o. female who presents to the emergency department for evaluation of probable fall. Patient was brought in from nursing home by [employer]. Was told by nurse that patient had been found on ground with a possible unwitnessed fall. Patient had significant left hip pain. Nurse stated that patient had been down for 2 hours before arrival.  Nurse was told by EMS she was 58% oxygen saturation. They placed her on a nonrebreather and backboard. Patient left lower extremity is shortened compared to right and is in external rotation position. Patient is very hard of hearing and history is limited due to this. Records from nursing home do not show she is on anticoagulation. Patient has extensive history  Patient is DNR CC. The patient has no other acute complaints at this time. REVIEW OF SYSTEMS   Review of Systems   Constitutional: Negative for chills. HENT: Negative for ear pain and sinus pain. Eyes: Negative for pain. Cardiovascular: Negative for chest pain. Gastrointestinal: Negative for abdominal pain. Genitourinary: Negative for flank pain. Musculoskeletal: Positive for gait problem and joint swelling. Negative for back pain. Neurological: Negative for facial asymmetry. Psychiatric/Behavioral: The patient is not hyperactive.           PAST MEDICAL AND SURGICAL HISTORY     Past Medical History:   Diagnosis Date    Anxiety     Chronic kidney disease     Fecal impaction ([facility] [address]     GERD (gastroesophageal reflux disease)     Hernia, ventral     Hiatal hernia     Hypertension     Neuropathy     peripheral    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    Peripheral edema     Peripheral vascular disease (HCC)     Venous stasis of lower extremity      Past Surgical History:   Procedure Laterality Date    APPENDECTOMY      CHOLECYSTECTOMY      COLONOSCOPY      polyps 4/2005    DILATION AND CURETTAGE OF UTERUS      HERNIA REPAIR           MEDICATIONS   No current facility-administered medications for this encounter. Current Outpatient Medications:     famotidine (PEPCID) 20 MG tablet, Take 20 mg by mouth daily, Disp: , Rfl:     loperamide (IMODIUM) 2 MG capsule, Take 2 mg by mouth 4 times daily as needed for Diarrhea, Disp: , Rfl:     miconazole (MICOTIN) 2 % powder, Apply topically 3 times daily Apply topically 2 times daily. , Disp: , Rfl:     divalproex (DEPAKOTE) 250 MG DR tablet, Take 250 mg by mouth daily, Disp: , Rfl:     bisacodyl (DULCOLAX) 10 MG suppository, Place 10 mg rectally daily as needed for Constipation, Disp: , Rfl:     donepezil (ARICEPT) 10 MG tablet, Take 10 mg by mouth nightly , Disp: , Rfl:     escitalopram (LEXAPRO) 5 MG tablet, Take 5 mg by mouth daily , Disp: , Rfl:     levothyroxine (SYNTHROID) 25 MCG tablet, Take 50 mcg by mouth Daily , Disp: , Rfl:     metolazone (ZAROXOLYN) 2.5 MG tablet, Take 2.5 mg by mouth three times a week , Disp: , Rfl:     docusate sodium (COLACE) 100 MG capsule, Take 100 mg by mouth 2 times daily , Disp: , Rfl:     ALPRAZolam (XANAX) 0.25 MG tablet, Take 0.25 mg by mouth 3 times daily.  , Disp: , Rfl:     magnesium hydroxide (MILK OF MAGNESIA) 400 MG/5ML suspension, Take 30 mLs by mouth daily as needed for Constipation, Disp: , Rfl:     ondansetron (ZOFRAN) 40 MG/20ML SOLN injection, Infuse 4 mg intravenously every 6 hours as needed for Nausea or Vomiting, Disp: , Rfl:     potassium chloride (KLOR-CON M) 20 MEQ TBCR extended release tablet, Take 2 tablets by mouth 2 times daily (with meals), Disp: 120 tablet, Rfl: 3    bumetanide (BUMEX) 1 MG tablet, take 1 tablet by mouth twice a day, Disp: 60 tablet, Rfl: 3    gabapentin (NEURONTIN) 100 MG capsule, take 1 capsule by mouth twice a day, Disp: 60 capsule, Rfl: 5    pantoprazole (PROTONIX) 40 MG tablet, take 1 tablet by mouth once daily, Disp: 30 tablet, Rfl: 5    acetaminophen (TYLENOL) 325 MG tablet, Take 650 mg by mouth every 6 hours as needed for Pain, Disp: , Rfl:     polyethylene glycol (GLYCOLAX) powder, take 17GM (DISSOLVED IN WATER) by mouth once daily, Disp: 1 Bottle, Rfl: 10      SOCIAL HISTORY     Social History     Social History Narrative    Not on file     Social History     Tobacco Use    Smoking status: Never Smoker    Smokeless tobacco: Never Used   Substance Use Topics    Alcohol use: No    Drug use: No         ALLERGIES     Allergies   Allergen Reactions    Aspirin Nausea Only    Pcn [Penicillins] Swelling     Throat swelling    Sulfa Antibiotics     Tetanus Toxoids Other (See Comments)     Edema of arm per patient    Prednisone Nausea And Vomiting         FAMILY HISTORY   History reviewed. No pertinent family history. PREVIOUS RECORDS   Previous records reviewed: Patient was [facility] in [facility] department in 2017 for back pain        PHYSICAL EXAM     ED Triage Vitals   BP Temp Temp Source Pulse Resp SpO2 Height Weight   07/20/20 1614 07/20/20 1632 07/20/20 1632 07/20/20 1614 07/20/20 1614 07/20/20 1614 07/20/20 1614 07/20/20 1614   129/67 97.5 °F (36.4 °C) Oral 71 17 99 % 5' 3\" (1.6 m) 146 lb (66.2 kg)     Initial vital signs and nursing assessment reviewed and normal. Pulsoximetry is normal per my interpretation. Additional Vital Signs:  Vitals:    07/20/20 2041   BP: 128/83   Pulse: 84   Resp: 16   Temp:    SpO2: 100%       Physical Exam  Constitutional:       General: She is not in acute distress.      Appearance: She is not ill-appearing, toxic-appearing or diaphoretic. Comments: Patient appears in pain   HENT:      Head: Normocephalic and atraumatic. Right Ear: Ear canal and external ear normal.      Left Ear: Ear canal and external ear normal.      Ears:      Comments: Bilateral cerumen blocking visualization of tympanic membrane  No iglesias sign   No CSF drainage       Nose: Nose normal.   Eyes:      General: No scleral icterus. Right eye: No discharge. Left eye: No discharge. Extraocular Movements: Extraocular movements intact. Conjunctiva/sclera: Conjunctivae normal.      Pupils: Pupils are equal, round, and reactive to light. Neck:      Musculoskeletal: Normal range of motion and neck supple. No neck rigidity or muscular tenderness. Cardiovascular:      Rate and Rhythm: Normal rate. Pulses: Normal pulses. Heart sounds: Normal heart sounds. No murmur. No friction rub. No gallop. Pulmonary:      Effort: Pulmonary effort is normal. No respiratory distress. Breath sounds: Normal breath sounds. No stridor. No wheezing, rhonchi or rales. Chest:      Chest wall: No tenderness. Abdominal:      General: Abdomen is flat. There is no distension. Palpations: Abdomen is soft. Tenderness: There is no abdominal tenderness. There is no guarding or rebound. Musculoskeletal:      Comments: Left lower extremity shortened compared to right  Left lower extremity in external rotation position  Distal left lower extremity pulse is intact   Neurological:      Mental Status: She is alert. MEDICAL DECISION MAKING   Initial Assessment: Fall with trauma and unknown history with possible altered mental status.   Hip fracture versus dislocation versus additional trauma  Plan:   Imaging  Labs  Pain management via fentanyl  Ortho consult  Trauma consult    X-ray showed left hip fracture  Patient admitted to trauma service        ED RESULTS   Laboratory results:  Labs Reviewed Lieutenant Jones on 7/20/2020 5:55 PM      XR KNEE LEFT (1-2 VIEWS)   Final Result    IMPRESSION:   No acute fracture or dislocation. **This report has been created using voice recognition software. It may contain minor errors which are inherent in voice recognition technology. **      Final report electronically signed by Dr. Aliya Ramos on 7/20/2020 5:54 PM          ED Medications administered this visit:   Medications   fentaNYL (SUBLIMAZE) injection 25 mcg (25 mcg Intravenous Given 7/20/20 1651)   fentaNYL (SUBLIMAZE) injection 25 mcg (25 mcg Intravenous Given 7/20/20 2046)         ED COURSE     ED Course as of Jul 20 2113   Mon Jul 20, 2020   1639 Labs Imaging Ordered. 25mcg fentanyl ordered    [CR]   1657 Cleared patient off backboard. No stepoffs or tenderness to palpation    [CR]   1922 Spoke to Dr Kelli Castro  for trauma consult. He stated he would see patient. [CR]      ED Course User Index  [CR] Jatinder Julian MD     Strict return precautions and follow up instructions were discussed with the patient prior to discharge, with which the patient agrees. MEDICATION CHANGES     New Prescriptions    No medications on file         FINAL DISPOSITION     Final diagnoses:   Closed fracture of left hip, initial encounter ([facility] [address]   Fall from bed, initial encounter     Condition: condition: stable  Dispo: Admit to med/surg floor      This transcription was electronically signed. Parts of this transcriptions may have been dictated by use of voice recognition software and electronically transcribed, and parts may have been transcribed with the assistance of an ED scribe. The transcription may contain errors not detected in proofreading. Please refer to my supervising physician's documentation if my documentation differs.     Electronically Signed: Jatinder Julian, 07/20/20, 9:13 PM       Jatinder Julian MD  Resident  07/20/20 2112       Jatinder Julian MD  Resident  07/20/20 8196

## 2020-07-20 NOTE — ED NOTES
ED nurse-to-nurse bedside report    Chief Complaint   Patient presents with    Hip Pain      LOC: alert and orientated to name, place, date  Vital signs   Vitals:    07/20/20 1614 07/20/20 1632 07/20/20 1736   BP: 129/67 136/72 (!) 170/79   Pulse: 71 72 71   Resp: 17 15 18   Temp:  97.5 °F (36.4 °C)    TempSrc:  Oral    SpO2: 99% 100% 99%   Weight: 146 lb (66.2 kg)     Height: 5' 3\" (1.6 m)        Pain:    Pain Interventions: Fentanyl  Pain Goal: N/A  Oxygen: Yes    Current needs required 10L Non-rebreather   Telemetry: Yes  LDAs:    Continuous Infusions:   Mobility: Fully dependent  Freeman Fall Risk Score:    Fall Risk 4/22/2020 11/10/2016 9/6/2016 7/12/2016 4/24/2015   2 or more falls in past year? no no no no no   Fall with injury in past year? no no no no no     Fall Interventions: Side rails, call light  Report given to: Samia Heard RN  07/20/20 6512

## 2020-07-20 NOTE — TELEPHONE ENCOUNTER
Severa Harpin ([phone]) - ask for Ángel Granados when we call back, called said that pt fell hard today. Complains of pain in the left hip.

## 2020-07-20 NOTE — ED NOTES
Pt back from CT. Patient lying supine in bed, pulling at non-re breather mask. Pt redirected. Patient VSS. Respirations are regular and unlabored, patient responds to stimulation. Patient bed rails up x2 and call light within reach. Will continue to monitor.      Livia Olivares RN  07/20/20 6385

## 2020-07-20 NOTE — ED NOTES
Pt up in bed with blankets over body and on backboard. Patient responds to stimulation by moaning Patient VSS. Respirations are regular and unlabored,. Patient bed rails up x2 and call light within reach. Will continue to monitor.  Call light within reach     Aden Gonzáles RN  07/20/20 8861

## 2020-07-21 NOTE — ED NOTES
ED to inpatient nurses report    Chief Complaint   Patient presents with    Hip Pain      Present to ED from [facility]  LOC: alert and orientated to name, place, date  Vital signs   Vitals:    07/20/20 1736 07/20/20 1939 07/20/20 2041 07/20/20 2153   BP: (!) 170/79 (!) 142/67 128/83 125/74   Pulse: 71 77 84 73   Resp: 18 18 16 20   Temp:       TempSrc:       SpO2: 99% 100% 100% 100%   Weight:       Height:          Oxygen Baseline 100%    Current needs required 10 L NC Bipap/Cpap no  LDAs:   Peripheral IV 07/20/20 Left Hand (Active)   Site Assessment Clean;Dry; Intact 07/20/20 1946   Line Status Normal saline locked 07/20/20 1946   Dressing Status Clean;Dry; Intact 07/20/20 1946     Mobility: Fully dependent  Pending ED orders: none  Present condition: stable    Electronically signed by Aleena Dinero RN on 7/20/2020 at 10:32 PM       Aleena Dinero RN  07/20/20 2908

## 2020-07-21 NOTE — PROGRESS NOTES
04/22/20 82   10/04/19 64       24 HR INTAKE/OUTPUT :     Intake/Output Summary (Last 24 hours) at 7/21/2020 0825  Last data filed at 7/21/2020 0315  Gross per 24 hour   Intake 169.6 ml   Output 400 ml   Net -230.4 ml     DIET GENERAL;    OBJECTIVE  CURRENT VITALS /65   Pulse 76   Temp 97.1 °F (36.2 °C) (Temporal)   Resp 22   Ht 5' 3\" (1.6 m)   Wt 142 lb 13.7 oz (64.8 kg)   SpO2 99%   BMI 25.31 kg/m²        GENERAL: Asleep at time of rounds. Awakens easily. Moans when touched or moved. NEURO: Unable to assess neuro status as patient has severe Alzheimer's  LUNGS: clear to auscultation bilaterally- no wheezes, rales or rhonchi, normal air movement, no respiratory distress  HEART: normal rate, normal S1 and S2, no gallops, intact distal pulses and no carotid bruits  ABDOMEN: soft, non-tender, non-distended, normal bowel sounds, no masses or organomegaly  EXTREMITY: Obvious deformity to left thigh. no cyanosis, no clubbing and no edema      LABS  CBC :   Recent Labs     07/20/20  1645 07/21/20  0720   WBC 5.3  --    HGB 11.2* 10.8*   HCT 35.2* 34.0*   .9*  --      --      BMP:   Recent Labs     07/20/20  1645      K 3.9   CL 98   CO2 30   BUN 19   CREATININE 0.8     COAGS: No results for input(s): APTT, PROT, INR in the last 72 hours. Pancreas/HFP:  No results for input(s): LIPASE, AMYLASE in the last 72 hours. No results for input(s): AST, ALT, BILIDIR, BILITOT, ALKPHOS in the last 72 hours. RADIOLOGY:  No new results      Electronically signed by CARMEN Chisholm - CNP on 7/21/2020 at 8:25 AM    Patient seen and examined independently by me earlier this AM. Above discussed and I agree with Jennifer Gutierrez CNP. See my additional comments below for updated orders and plan. Labs, cultures, and radiographs where available were reviewed. I discussed patient concerns with the patient's nurse and instructions were given.  Please see our orders for the updated patient care plan.  -Neurovascular checks. SCDs for DVT prophylaxis. Hip fracture per orthopedic service. Diet as tolerated. Repeat labs in a.m. Therapy as appropriate. Most likely return to [facility] when appropriate and okay with consultants.     Electronically signed by Vika Ivy MD on 7/21/20 at 6:49 PM EDT

## 2020-07-21 NOTE — CARE COORDINATION
7/21/20, 1:43 PM EDT  DISCHARGE PLANNING EVALUATION:    Tanya Aguilera       Admitted from: ED 7/20/2020/ Ludwig Fonseca day: 1   Location: [facility]20/020-A Reason for admit: Closed left hip fracture, initial encounter ([facility] [address] Barbra Baldwin Status: inpatient  Admit order signed?: yes  PMH:  has a past medical history of Alzheimer disease ([address] Anxiety, Chronic kidney disease, Cognitive communication deficit, Constipation, Delusion ([address] Dysphagia, Fecal impaction ([address] GERD (gastroesophageal reflux disease), GERD (gastroesophageal reflux disease), Hernia, ventral, Hiatal hernia, Hypertension, Hypothyroid, Malnutrition ([address] Movement disorder, Neuropathy, Osteoarthritis, Osteoarthritis, Peripheral edema, Peripheral vascular disease ([address] Polyneuropathy, Psychiatric problem, and Venous stasis of lower extremity. Procedure: none yet  Pertinent abnormal Imaging:left hip fracture  Medications:  Scheduled Meds:   metoprolol tartrate  12.5 mg Oral BID    sodium chloride flush  10 mL Intravenous 2 times per day    famotidine (PEPCID) injection  20 mg Intravenous Daily     Continuous Infusions:   sodium chloride 75 mL/hr at 07/21/20 0930      Pertinent Info/Orders/Treatment Plan:  Bedrest. N/V checks. I&O. OK to remove cervical collar. Pain management. Monitor labs and VS. ECHO today. Conservative treatment at nursing home vs left hip surgical repair. Patient has dementia. Niece makes decisions. [place], spoke with her this morning. NPO after midnight for possible surgery tomorrow. Covid test negative. Daily weights  Diet: DIET GENERAL;  Diet NPO, After Midnight   Smoking status:  reports that she has never smoked.  She has never used smokeless tobacco.   PCP: Klaudia Moise MD  Readmission 30 days or less: no  Readmission Risk Score: 12%    Discharge Planning Evaluation  Current Residence:  Nursing Home  Living Arrangements:  Other (Comment)(ECF)   Support Systems:  Family Members  Current Services PTA:     Potential Assistance Needed:  Extended Care Placement, Skilled Nursing Facility  Potential Assistance Purchasing Medications:  No  Does patient want to participate in local refill/ meds to beds program?  No  Type of Home Care Services:  None  Patient expects to be discharged to: [facility]  Expected Discharge date:  07/27/20  Follow Up Appointment: Best Day/ Time: (anytime)    Patient Goals/Plan/Treatment Preferences: Lea Mathew, on case. Dementia. From [facility]. Transportation/Food Security/Housekeeping Addressed:  No issues identified.     Evaluation: yes

## 2020-07-21 NOTE — PROGRESS NOTES
Pt admitted to  7K20 from ED. Complaints: Fall with left hip fx and respiratory distress. IV in left hand. IV site free of s/s of infection or infiltration. Vital signs obtained. Assessment and data collection initiated. Two nurse skin assessment performed by Ana María Last RN and Ruthie Scott. Oriented to room. Explained patients right to have family, representative or physician notified of their admission. Order for patient's primary care physician to be notified. Will notify family of admission. The patient is interested in [facility]. Jocys meds to beds program?:  No     Bed alarm is on and patient is in lowest position.

## 2020-07-21 NOTE — DISCHARGE INSTR - COC
Continuity of Care Form    Patient Name: Blenda Phalen   :  2/3/1921  MRN:  [id number]    [facility] date:  2020  Discharge date:  2020    Code Status Order: [facility]   Advance Directives:   [address] [facility] Documentation     Date/Time Healthcare Directive Type of Healthcare Directive Copy in [address] Agent's Name Healthcare Agent's Phone Number    [phone]  Unknown, patient unable to respond due to medical condition -- -- -- -- --          Admitting Physician:  Rashmi Ferrer MD  PCP: Amanda Pettit MD    Discharging Nurse: [facility] Unit/Room#: 9K-25/46-A  Discharging Unit Phone Number: [phone]    Emergency Contact:   Extended Emergency Contact Information  Primary Emergency Contact: Alisa Jernigan   [address] Phone: [phone]  Relation: Niece/Nephew    Past Surgical History:  Past Surgical History:   Procedure Laterality Date    APPENDECTOMY      CHOLECYSTECTOMY      COLONOSCOPY      polyps 2005    DILATION AND CURETTAGE OF UTERUS      HERNIA REPAIR         Immunization History:   Immunization History   Administered Date(s) Administered    Pneumococcal Conjugate 13-valent ([employer]) 2014    Pneumococcal Polysaccharide ([id number]) 09/15/2015       Active Problems:  Patient Active Problem List   Diagnosis Code    Anxiety F41.9    Neuropathy G62.9    Hypertension I10    GERD (gastroesophageal reflux disease) K21.9    Hernia, ventral K43.9    Constipation K59.00    Osteoarthritis M19.90    Venous stasis of lower extremity I87.8    CKD (chronic kidney disease), stage III ([facility] [address] N18.3    Hypothyroidism (acquired) E03.9    Pain in joint involving left ankle and foot M25.572    Gait instability R26.81    Peripheral edema R60.9    Weakness generalized R53.1    Hypokalemia E87.6    Impaired mobility and ADLs Z74.09    Sensorineural hearing loss (SNHL) of both ears H90.3    Moderate malnutrition ([facility] [address] E44.0    Closed left hip fracture, initial encounter [facility]) S72.002A    Fall W19. Rylee Templeton       Isolation/Infection:   Isolation          Droplet Plus        Patient Infection Status     Infection Onset Added Last Indicated Last Indicated By Review Planned Expiration Resolved Resolved By    None active    Resolved    COVID-19 Rule Out 07/21/20 07/21/20 07/21/20 COVID-19 (Ordered)   07/21/20 Rule-Out Test Resulted          Nurse Assessment:  Last Vital Signs: /70 Comment: manual  Pulse 80   Temp 97.9 °F (36.6 °C) (Axillary)   Resp 20   Ht 5' 3\" (1.6 m)   Wt 142 lb 13.7 oz (64.8 kg)   SpO2 90%   BMI 25.31 kg/m²     Last documented pain score (0-10 scale): Pain Level: 7(relaxes when left alone)  Last Weight:   Wt Readings from Last 1 Encounters:   07/21/20 142 lb 13.7 oz (64.8 kg)     Mental Status:  disoriented    IV Access:  - None    Nursing Mobility/ADLs:  Walking   Dependent  Transfer  Dependent  Bathing  Dependent  Dressing  Dependent  Toileting  Dependent  Feeding  Dependent  Med Admin  Dependent  Med Delivery   prefers mixed with applesauce    Wound Care Documentation and Therapy:  Wound 07/20/20 Buttocks Left stage 2, triagle in shape. open area (Active)   Wound Pressure Stage  2 07/20/20 2245   Dressing/Treatment Open to air 07/21/20 0930   Drainage Amount None 07/21/20 0930   Odor None 07/21/20 0930   Arelis-wound Assessment Red 07/21/20 0930   Number of days: 0        Elimination:  Continence:   · Bowel: No  · Bladder: No  Urinary Catheter:   Colostomy/Ileostomy/Ileal Conduit: No       Date of Last BM: 7-22-20    Intake/Output Summary (Last 24 hours) at 7/21/2020 1416  Last data filed at 7/21/2020 1315  Gross per 24 hour   Intake 758.51 ml   Output 650 ml   Net 108.51 ml     I/O last 3 completed shifts:   In: 169.6 [I.V.:169.6]  Out: 400 [Urine:400]    Safety Concerns:     History of Falls (last 30 days)    Impairments/Disabilities:      Hearing    Nutrition Therapy:  Current Nutrition Therapy:   - Oral Diet:  General    Routes of Feeding: Oral  Liquids: No Restrictions  Daily Fluid Restriction: no  Last Modified Barium Swallow with Video (Video Swallowing Test): not done    Treatments at the Time of Hospital Discharge:   Respiratory Treatments:   Oxygen Therapy:  is on oxygen at 1 L/min per nasal cannula. Ventilator:    - No ventilator support    Rehab Therapies: Physical Therapy, Occupational Therapy and Speech/Language Therapy  Weight Bearing Status/Restrictions: No weight bearing restirctions  Other Medical Equipment (for information only, NOT a DME order):  walker and hospital bed  Other Treatments:     Patient's personal belongings (please select all that are sent with patient):  Hearing Aides bilateral    RN SIGNATURE:  Electronically signed by Kal Britt RN on 7/25/20 at 3:13 PM EDT    CASE MANAGEMENT/SOCIAL WORK SECTION    Inpatient Status Date: 07/20/2020    Readmission Risk Assessment Score:  Readmission Risk              Risk of Unplanned Readmission:        12           Discharging to Facility/ Agency   · Name: [facility]  · Address: [address]  [facility]  · Phone: [phone]  · Fax: 4      Dialysis Facility (if applicable)   · Name:  · Address:  · Dialysis Schedule:  · Phone:  · Fax:    / signature: Electronically signed by ELIDA Champagne on 7/21/2020 at 2:17 PM     PHYSICIAN SECTION    Prognosis: Fair    Condition at Discharge: Stable    Rehab Potential (if transferring to Rehab): Fair    Recommended Labs or Other Treatments After Discharge: check non fasting BMP 4/67/3945    Physician Certification: I certify the above information and transfer of [place]  is necessary for the continuing treatment of the diagnosis listed and that she requires [facility] for greater 30 days.      Update Admission H&P: Changes in H&P as follows - the hip fracture has been nailed.     PHYSICIAN SIGNATURE:  Electronically signed by Matteo Morris MD on 7/25/20 at 2:17 PM EDT

## 2020-07-21 NOTE — ED NOTES
Pt resting in bed. Pt noted to be incontinent of urine. Moran placed. Linens changed. Pt moaning and yelling in pain. Provider notified and obtained order for pain medication. Pt denies other needs. Call light in reach.       Francoise Bumpers, RN  07/20/20 2160

## 2020-07-21 NOTE — H&P
Trauma H&P     Patient: Shireen Buckner  Admit date: 7/20/2020   YOB: 1921 Date of Evaluation: 7/20/2020  MRN: [id number]  Acct: [de-identified]    Injury Date:7/20/2020 Injury time:afternoon  PCP: Benja Arshad MD   Referring physician: Dr. Cecil Fall    Time of Trauma Surgeon Notification:  1922 July 20, 2020  Time of RAFFI Arrival:1930  Time of Trauma Surgeon Arrival 2110 July 20, 2020    Assessment:    Unwitnessed fall  Left hip fracture  Plan:    Patient admitted under Trauma Services     Unwitnessed fall   -PT/OT eval and treat   -Neuro checks   - SLP cog eval    Left hip fracture   - Consult to orthopedic surgery   - Pain control   - Neurovascular checks   - Further intervention pending ortho recommendation.    Consults orthopedic surgery    Pain Management   -Morphine    Prophylaxis: SCD's, Incentive Spirometry, Colace, Pepcid, Zofran    NPO until ortho evaluates    IVF Management  Regular Neurovascular Checks  Repeat Labs Tomorrow AM  PT/OT/SLP Eval and Treat  Bedrest until ortho evaluates    Planned Discharge pending clinical course       Activation: []Level I (Trauma Alert) []Level II (Injury Call) [x]Level III (Trauma Consult) [] Downgraded (Time: )   Mode of Arrival: EMS transportation  Referring Facility: none  Loss of Consciousness []No []Yes[x]Unknown  Duration(min)  Mechanism of Injury:  []Motor Vehicle crash   []Single Vehicle [] []Passenger []Scene Fatality []Front Seat  []Restrained   []Air Bag Deployed   []Ejected []Rollover []Pedestrian []Trapped   Type of vehicle:   Protective Devices:   []Motorcycle  Wearing Helmet []Yes []No  []Bicycle  Wearing Helmet []Yes []No  [x]Fall   Distance - Unknown   []Assault    Abuse Reported []Yes []No  []Gunshot  []Stabbing  []Work Related  []Burn: []Flame []Scald []Electrical []Chemical []Contact []Inhalation []House Fire  []Other:   Patient Active Problem List   Diagnosis    Anxiety    Neuropathy    Hypertension    GERD (gastroesophageal peripheral    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    Peripheral edema     Peripheral vascular disease (HCC)     Venous stasis of lower extremity      Past Surgical History:   Procedure Laterality Date    APPENDECTOMY      CHOLECYSTECTOMY      COLONOSCOPY      polyps 4/2005    DILATION AND CURETTAGE OF UTERUS      HERNIA REPAIR       Social History     Socioeconomic History    Marital status:      Spouse name: None    Number of children: None    Years of education: None    Highest education level: None   Occupational History    None   Social Needs    Financial resource strain: None    Food insecurity     Worry: None     Inability: None    Transportation needs     Medical: None     Non-medical: None   Tobacco Use    Smoking status: Never Smoker    Smokeless tobacco: Never Used   Substance and Sexual Activity    Alcohol use: No    Drug use: No    Sexual activity: Not Currently   Lifestyle    Physical activity     Days per week: None     Minutes per session: None    Stress: None   Relationships    Social connections     Talks on phone: None     Gets together: None     Attends [employer] service: None     Active member of club or organization: None     Attends meetings of clubs or organizations: None     Relationship status: None    Intimate partner violence     Fear of current or ex partner: None     Emotionally abused: None     Physically abused: None     Forced sexual activity: None   Other Topics Concern    None   Social History Narrative    None     History reviewed. No pertinent family history. Home medications:    Previous Medications    ACETAMINOPHEN (TYLENOL) 325 MG TABLET    Take 650 mg by mouth every 6 hours as needed for Pain    ALPRAZOLAM (XANAX) 0.25 MG TABLET    Take 0.25 mg by mouth 3 times daily.      BISACODYL (DULCOLAX) 10 MG SUPPOSITORY    Place 10 mg rectally daily as needed for Constipation    BUMETANIDE (BUMEX) 1 MG TABLET    take 1 tablet by mouth twice a day DIVALPROEX (DEPAKOTE) 250 MG DR TABLET    Take 250 mg by mouth daily    DOCUSATE SODIUM (COLACE) 100 MG CAPSULE    Take 100 mg by mouth 2 times daily     DONEPEZIL (ARICEPT) 10 MG TABLET    Take 10 mg by mouth nightly     ESCITALOPRAM (LEXAPRO) 5 MG TABLET    Take 5 mg by mouth daily     FAMOTIDINE (PEPCID) 20 MG TABLET    Take 20 mg by mouth daily    GABAPENTIN (NEURONTIN) 100 MG CAPSULE    take 1 capsule by mouth twice a day    LEVOTHYROXINE (SYNTHROID) 25 MCG TABLET    Take 50 mcg by mouth Daily     LOPERAMIDE (IMODIUM) 2 MG CAPSULE    Take 2 mg by mouth 4 times daily as needed for Diarrhea    MAGNESIUM HYDROXIDE (MILK OF MAGNESIA) 400 MG/5ML SUSPENSION    Take 30 mLs by mouth daily as needed for Constipation    METOLAZONE (ZAROXOLYN) 2.5 MG TABLET    Take 2.5 mg by mouth three times a week     MICONAZOLE (MICOTIN) 2 % POWDER    Apply topically 3 times daily Apply topically 2 times daily.     ONDANSETRON (ZOFRAN) 40 MG/20ML SOLN INJECTION    Infuse 4 mg intravenously every 6 hours as needed for Nausea or Vomiting    PANTOPRAZOLE (PROTONIX) 40 MG TABLET    take 1 tablet by mouth once daily    POLYETHYLENE GLYCOL (GLYCOLAX) POWDER    take 17GM (DISSOLVED IN WATER) by mouth once daily    POTASSIUM CHLORIDE (KLOR-CON M) 20 MEQ TBCR EXTENDED RELEASE TABLET    Take 2 tablets by mouth 2 times daily (with meals)       Hospital medications:  Scheduled Meds:  Continuous Infusions:  PRN Meds:  Objective   ED TRIAGE VITALS  BP: 125/74, Temp: 97.5 °F (36.4 °C), Pulse: 73, Resp: 20, SpO2: 100 %     Results for orders placed or performed during the hospital encounter of 07/20/20   CK   Result Value Ref Range    Total CK 45 30 - 135 U/L   CBC Auto Differential   Result Value Ref Range    WBC 5.3 4.8 - 10.8 thou/mm3    RBC 3.49 (L) 4.20 - 5.40 mill/mm3    Hemoglobin 11.2 (L) 12.0 - 16.0 gm/dl    Hematocrit 35.2 (L) 37.0 - 47.0 %    .9 (H) 81.0 - 99.0 fL    MCH 32.1 26.0 - 33.0 pg    MCHC 31.8 (L) 32.2 - 35.5 gm/dl    RDW-CV 14.3 11.5 - 14.5 %    RDW-SD 51.8 (H) 35.0 - 45.0 fL    Platelets 916 395 - 293 thou/mm3    MPV 10.4 9.4 - 12.4 fL    Seg Neutrophils 76.2 %    Lymphocytes 14.6 %    Monocytes 7.3 %    Eosinophils 1.1 %    Basophils 0.4 %    Immature Granulocytes 0.4 %    Segs Absolute 4.0 1.8 - 7.7 thou/mm3    Lymphocytes Absolute 0.8 (L) 1.0 - 4.8 thou/mm3    Monocytes Absolute 0.4 0.4 - 1.3 thou/mm3    Eosinophils Absolute 0.1 0.0 - 0.4 thou/mm3    Basophils Absolute 0.0 0.0 - 0.1 thou/mm3    Immature Grans (Abs) 0.02 0.00 - 0.07 thou/mm3    nRBC 0 /100 wbc   Basic Metabolic Panel w/ Reflex to MG   Result Value Ref Range    Sodium 137 135 - 145 meq/L    Potassium reflex Magnesium 3.9 3.5 - 5.2 meq/L    Chloride 98 98 - 111 meq/L    CO2 30 23 - 33 meq/L    Glucose 111 (H) 70 - 108 mg/dL    BUN 19 7 - 22 mg/dL    CREATININE 0.8 0.4 - 1.2 mg/dL    Calcium 8.7 8.5 - 10.5 mg/dL   Anion Gap   Result Value Ref Range    Anion Gap 9.0 8.0 - 16.0 meq/L   Glomerular Filtration Rate, Estimated   Result Value Ref Range    Est, Glom Filt Rate 66 (A) ml/min/1.73m2   Osmolality   Result Value Ref Range    Osmolality Calc 276.8 275.0 - 300 mOsmol/kg   EKG 12 Lead   Result Value Ref Range    Ventricular Rate 68 BPM    Atrial Rate 68 BPM    P-R Interval 192 ms    QRS Duration 108 ms    Q-T Interval 428 ms    QTc Calculation (Bazett) 455 ms    P Axis 71 degrees    R Axis -30 degrees    T Axis 80 degrees   TYPE AND SCREEN   Result Value Ref Range    ABO O     Rh Factor POS     Antibody Screen NEG        Physical Exam:  Patient Vitals for the past 24 hrs:   BP Temp Temp src Pulse Resp SpO2 Height Weight   07/20/20 2153 125/74 -- -- 73 20 100 % -- --   07/20/20 2041 128/83 -- -- 84 16 100 % -- --   07/20/20 1939 (!) 142/67 -- -- 77 18 100 % -- --   07/20/20 1736 (!) 170/79 -- -- 71 18 99 % -- --   07/20/20 1632 136/72 97.5 °F (36.4 °C) Oral 72 15 100 % -- --   07/20/20 1614 129/67 -- -- 71 17 99 % 5' 3\" (1.6 m) 146 lb (66.2 kg)     Primary Assessment:  Airway: Patent, trachea midline  Breathing: Breath sounds present and equal bilaterally, spontaneous, and unlabored  Circulation: Hemodynamically stable, 2+peripheral pulses. Disability: MABRY x 4, following commands. GCS = 10    Secondary Assessment:  General: Alert, NAD. Not oriented  Head: Normocephalic, mid face stable,   Eyes: PERRLA, EOMI, Nontraumatic  Neurologic: not oriented, not following commands. CN 2-12 grossly intact  Neck: Immobilized in cervical collar, trachea midline. Cervical spines NTTP midline, without step-offs, crepitus or deformity. Back:TL spines are NTTP midline, without step-offs, crepitus or deformity. No abrasions, contusions, or ecchymosis noted. Lungs: Clear to auscultation bilaterally. Chest Wall: Chest rise symmetrical.  Chest wall without tenderness to palpation. No crepitus, deformities, lacerations, or abrasions. Heart: RRR. Normal S1/S2. No obvious M/G/R. Abdomen:  Soft. No guarding. Non-peritoneal.  Pelvis: stable to compression. Femoral pulses 2+. Extremities: left leg shortened and externally rotated. No other gross deformities. Unable to evaluate motor and sensory status. Radial /DP/PT pulses 2+ bilaterally. Skin: Skin warm and dry. Normal for ethnicity. Radiology:     CT Head WO Contrast   Final Result   No acute process            **This report has been created using voice recognition software. It may contain minor errors which are inherent in voice recognition technology. **      Final report electronically signed by Dr. Valeria Guzman on 7/20/2020 6:09 PM      CT Cervical Spine WO Contrast   Final Result   Motion artifact. No acute fracture. **This report has been created using voice recognition software. It may contain minor errors which are inherent in voice recognition technology. **      Final report electronically signed by Dr. Hema Zapata on 7/20/2020 6:13 PM      XR CHEST 1 VW   Final Result    IMPRESSION: No acute fracture. **This report has been created using voice recognition software. It may contain minor errors which are inherent in voice recognition technology. **      Final report electronically signed by Dr. Nupur Edwards on 7/20/2020 5:57 PM      XR HIP 2-3 VW W PELVIS LEFT   Final Result   1. Acute left hip fracture without dislocation. **This report has been created using voice recognition software. It may contain minor errors which are inherent in voice recognition technology. **      Final report electronically signed by Dr. Nupur Edwards on 7/20/2020 5:55 PM      XR KNEE LEFT (1-2 VIEWS)   Final Result    IMPRESSION:   No acute fracture or dislocation. **This report has been created using voice recognition software. It may contain minor errors which are inherent in voice recognition technology. **      Final report electronically signed by Dr. Nupur Edwards on 7/20/2020 5:54 PM        Fast Exam: No - workup performed by ED provider    Electronically signed by LEVI Luo on 7/20/2020 at 10:06 PM    Patient seen and examined independently by me on evening of 7/20/20. Above discussed and I agree with LEVI Luo. See my additional comments below for updated orders and plan. Labs, cultures, and radiographs where available were reviewed. I discussed patient concerns with the patient's nurse and instructions were given. Please see our orders for the updated patient care plan. -Admit for pain control. Orthopedic consultation. Bed rest until seen by orthopedic service. Neurovascular checks. Cognition evaluation with speech therapy. PT/OT when appropriate. SCDs for DVT prophylaxis. Advance diet after seen by orthopedic service and recommendations given. A.m. labs.     Electronically signed by Franco Farias MD on 7/21/20 at 7:03 AM EDT

## 2020-07-21 NOTE — CONSULTS
surgery.        Past Medical History:    Past Medical History:   Diagnosis Date    Alzheimer disease ([facility]     Anxiety     Chronic kidney disease     Cognitive communication deficit     Constipation     Delusion ([facility]     Dysphagia     Fecal impaction (HCC)     GERD (gastroesophageal reflux disease)     GERD (gastroesophageal reflux disease)     Hernia, ventral     Hiatal hernia     Hypertension     Hypothyroid     Malnutrition ([facility]     Movement disorder     Neuropathy     peripheral    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    Osteoarthritis     Peripheral edema     Peripheral vascular disease ([facility] [facility]     Polyneuropathy     Psychiatric problem     anxiety    Venous stasis of lower extremity        Past Surgical History:    Past Surgical History:   Procedure Laterality Date    APPENDECTOMY      CHOLECYSTECTOMY      COLONOSCOPY      polyps 4/2005    DILATION AND CURETTAGE OF UTERUS      HERNIA REPAIR         Medications Prior to Admission:   Current Facility-Administered Medications   Medication Dose Route Frequency Provider Last Rate Last Dose    sodium chloride flush 0.9 % injection 10 mL  10 mL Intravenous 2 times per day LEVI Shetty   10 mL at 07/21/20 0007    sodium chloride flush 0.9 % injection 10 mL  10 mL Intravenous PRN LEVI Mackenzie        acetaminophen (TYLENOL) tablet 650 mg  650 mg Oral Q4H PRN LEVI Shetty        polyethylene glycol (GLYCOLAX) packet 17 g  17 g Oral Daily PRN LEVI Shetty        promethazine (PHENERGAN) tablet 12.5 mg  12.5 mg Oral Q6H PRN LEVI Shetty        Or    ondansetron (ZOFRAN) injection 4 mg  4 mg Intravenous Q6H PRN LEVI Shetty        0.9 % sodium chloride infusion   Intravenous Continuous LEVI Shetty 50 mL/hr at 07/21/20 0007      fentaNYL (SUBLIMAZE) injection 25 mcg  25 mcg Intravenous Q1H PRN LEVI Shetty        Or    fentaNYL (SUBLIMAZE) injection 50 mcg  50 mcg Intravenous Q1H PRN LEVI Mackenzie  famotidine (PEPCID) injection 20 mg  20 mg Intravenous Daily LEVI Shetty           Allergies:  Aspirin; Pcn [penicillins]; Seroquel [quetiapine]; Sulfa antibiotics; Tetanus toxoids; and Prednisone    Social History:   Social History     Tobacco Use   Smoking Status Never Smoker   Smokeless Tobacco Never Used     Social History     Substance and Sexual Activity   Alcohol Use No     Social History     Substance and Sexual Activity   Drug Use No       Family History:  History reviewed. No pertinent family history. REVIEW OF SYSTEMS:  Limited secondary to cognition  Musculoskeletal: Positive for left hip fracture      PHYSICAL EXAM:  Patient Vitals for the past 24 hrs:   BP Temp Temp src Pulse Resp SpO2 Height Weight   07/21/20 0315 119/65 97.1 °F (36.2 °C) Temporal 76 22 99 % -- --   07/21/20 0115 -- -- -- -- -- -- 5' 3\" (1.6 m) 142 lb 13.7 oz (64.8 kg)   07/20/20 2330 -- -- -- -- -- 98 % -- --   07/20/20 2304 -- -- -- -- -- 100 % -- --   07/20/20 2245 (!) 144/61 97.5 °F (36.4 °C) Axillary 76 12 100 % -- --   07/20/20 2153 125/74 -- -- 73 20 100 % -- --   07/20/20 2041 128/83 -- -- 84 16 100 % -- --   07/20/20 1939 (!) 142/67 -- -- 77 18 100 % -- --   07/20/20 1736 (!) 170/79 -- -- 71 18 99 % -- --   07/20/20 1632 136/72 97.5 °F (36.4 °C) Oral 72 15 100 % -- --   07/20/20 1614 129/67 -- -- 71 17 99 % 5' 3\" (1.6 m) 146 lb (66.2 kg)     General appearance:  Alert. No apparent distress, appears stated age. HEENT:  Normal cephalic, atraumatic. Neck: C-spine collar intact  Respiratory:  Normal respiratory effort. No audible Wheezes or Rhonchi. Cardiovascular:  Regular rate and rhythm. Musculoskeletal: LLE skin intact. Log roll positive. Compartments and calf soft . DP/PT pulses 2+. 2+ pitting edema feet and ankles. Skin: Skin color, texture, turgor normal.  No rashes or lesions. Neurologic:  Neurovascularly intact without any focal sensory/motor deficits. Sensation intact.        DATA:  CBC:   Lab Results   Component Value Date    WBC 5.3 07/20/2020    HGB 11.2 07/20/2020     07/20/2020     BMP:    Lab Results   Component Value Date     07/20/2020    K 3.9 07/20/2020    CL 98 07/20/2020    CO2 30 07/20/2020    BUN 19 07/20/2020    CREATININE 0.8 07/20/2020    CALCIUM 8.7 07/20/2020    GLUCOSE 111 07/20/2020     PT/INR:    Lab Results   Component Value Date    INR 1.04 01/01/2015     Troponin:    Lab Results   Component Value Date    TROPONINI <0.006 06/01/2013    TROPONINI <0.006 02/14/2012     No results for input(s): LIPASE, AMYLASE in the last 72 hours. No results for input(s): AST, ALT, BILIDIR, BILITOT, ALKPHOS in the last 72 hours. Radiology:   Xr Knee Left (1-2 Views)    Result Date: 7/20/2020  PROCEDURE: XR KNEE LEFT (1-2 VIEWS) CLINICAL INFORMATION: fall. COMPARISON: May 22, 2016  TECHNIQUE: 4 views of the left knee include an AP view, a lateral view and bilateral oblique views. FINDINGS: Degenerative changes. Osteopenia. No acute fracture or dislocation. No significant joint effusion. Soft tissues are unremarkable. IMPRESSION: No acute fracture or dislocation. **This report has been created using voice recognition software. It may contain minor errors which are inherent in voice recognition technology. ** Final report electronically signed by Dr. Hanny Roland on 7/20/2020 5:54 PM    Ct Head Wo Contrast    Result Date: 7/20/2020  PROCEDURE: CT HEAD WO CONTRAST CLINICAL INFORMATION: Fall . COMPARISON: 7/30/2014 TECHNIQUE: 2-D multiplanar noncontrast images of the brain All CT scans at this facility use dose modulation, iterative reconstruction, and/or weight-based dosing when appropriate to reduce radiation dose to as low as reasonably achievable. FINDINGS: Generalized cerebral volume loss. Moderate severity chronic small vessel ischemic disease changes. No acute infarction or hemorrhage identified no extra-axial fluid collection.  Paranasal sinuses and mastoid air cells are clear Pelvis Left    Result Date: 7/20/2020  PROCEDURE: XR HIP 2-3 VW W PELVIS LEFT CLINICAL INFORMATION: fall with shortened and ext rotation . COMPARISON: June 9, 2017 TECHNIQUE: Single view pelvis and 2 views of the left hip. FINDINGS: Acute left femoral neck fracture with partial impaction. No dislocation. Generalized osteopenia. Stool throughout the colon. Soft tissues are unremarkable. 1. Acute left hip fracture without dislocation. **This report has been created using voice recognition software. It may contain minor errors which are inherent in voice recognition technology. ** Final report electronically signed by Dr. Thu El on 7/20/2020 5:55 PM      ASSESSMENT:Active Problems:    Closed left hip fracture, initial encounter [facility])    Fall  Resolved Problems:    * No resolved hospital problems. *       PLAN as discussed with Dr. Alcocer: Will attempt to reach out to the family for their input on conservative vs surgical management of the fracture. Patient does not communicate effectively secondary to dementia. No surgery planned for today. Continue bedrest  Multimodal pain medications  Further recommendations pending family input.        Electronically signed by CARMEN Mayers CNP on 7/21/2020 at 7:40 AM

## 2020-07-21 NOTE — PLAN OF CARE
Risk for impaired skin integrity will decrease  Outcome: Ongoing  Note: Patient remains a high risk for skin integrity due to decrease in movement. Problem: Activity:  Goal: Ability to ambulate will improve  Description: Ability to ambulate will improve  Outcome: Ongoing  Note: Patient is currently on bedrest. Therapy ordered once patient is off bedrest.      Problem: Physical Regulation:  Goal: Will remain free from infection  Description: Will remain free from infection  Outcome: Ongoing  Note: No s/sx of infection during this shift. Goal: Postoperative complications will be avoided or minimized  Description: Postoperative complications will be avoided or minimized  Outcome: Ongoing  Note: Patient is awaiting surgery decision. Problem: Respiratory:  Goal: Ability to maintain adequate ventilation will improve  Description: Ability to maintain adequate ventilation will improve  Outcome: Ongoing  Note: Patient is currently on 4 L via NC with sats in the low 90s. Problem: Safety:  Goal: Ability to remain free from injury will improve  Description: Ability to remain free from injury will improve  Outcome: Ongoing  Note: Patient has remained free of physical injury during this shift. Safe environment provided, call light within reach, and hourly rounding completed. Problem: Tissue Perfusion:  Goal: Risk of venous thrombosis will decrease  Description: Risk of venous thrombosis will decrease  Outcome: Ongoing  Note: No s/sx of DVT during this shift. Problem: Urinary Elimination:  Goal: Ability to reestablish a normal urinary elimination pattern will improve - after catheter removal  Description: Ability to reestablish a normal urinary elimination pattern will improve  Outcome: Ongoing  Note: Moran catheter present with adequate amounts of clear, emeli urine.       Problem: Discharge Planning:  Goal: Discharged to appropriate level of care  Description: Discharged to appropriate level of care  Outcome: Ongoing  Note: Discharge planning in progress. Patient is planning to return to [facility] at discharge. Case management assisting with discharge needs. Care plan reviewed with patient. Patient verbalize understanding of the plan of care and contribute to goal setting.

## 2020-07-21 NOTE — PROGRESS NOTES
Blenda Phalen is a 80 y.o. female patient. Current Facility-Administered Medications   Medication Dose Route Frequency Provider Last Rate Last Dose    metoprolol tartrate (LOPRESSOR) tablet 12.5 mg  12.5 mg Oral BID Amanda Pettit MD        sodium chloride flush 0.9 % injection 10 mL  10 mL Intravenous 2 times per day Les Iron LEVI Fuller   10 mL at 07/21/20 0007    sodium chloride flush 0.9 % injection 10 mL  10 mL Intravenous PRN Chey LEVI Allen        acetaminophen (TYLENOL) tablet 650 mg  650 mg Oral Q4H PRN LEVI Shetty        polyethylene glycol (GLYCOLAX) packet 17 g  17 g Oral Daily PRN LEVI Shetty        promethazine (PHENERGAN) tablet 12.5 mg  12.5 mg Oral Q6H PRN LEVI Shetty        Or    ondansetron (ZOFRAN) injection 4 mg  4 mg Intravenous Q6H PRN LEVI Shetty        0.9 % sodium chloride infusion   Intravenous Continuous Amanda Pettit MD 75 mL/hr at 07/21/20 0930      fentaNYL (SUBLIMAZE) injection 25 mcg  25 mcg Intravenous Q1H PRN LEVI Shetty   25 mcg at 07/21/20 1531    Or    fentaNYL (SUBLIMAZE) injection 50 mcg  50 mcg Intravenous Q1H PRN LEVI Shetty        famotidine (PEPCID) injection 20 mg  20 mg Intravenous Daily LEVI Shetty         Allergies   Allergen Reactions    Aspirin Nausea Only    Pcn [Penicillins] Swelling     Throat swelling    Seroquel [Quetiapine]     Sulfa Antibiotics     Tetanus Toxoids Other (See Comments)     Edema of arm per patient    Prednisone Nausea And Vomiting     Active Problems:    Closed left hip fracture, initial encounter [facility])    Fall  Resolved Problems:    * No resolved hospital problems. *    Blood pressure 118/68, pulse 85, temperature 98 °F (36.7 °C), temperature source Axillary, resp. rate 20, height 5' 3\" (1.6 m), weight 142 lb 13.7 oz (64.8 kg), SpO2 95 %, not currently breastfeeding. Subjective:  Symptoms:  Stable. Diet:  Poor intake. Activity level: Impaired due to weakness. Pain:  She complains of pain that is moderate. Pain is requiring pain medication. Objective:  General Appearance: In no acute distress. Vital signs: (most recent): Blood pressure 118/68, pulse 85, temperature 98 °F (36.7 °C), temperature source Axillary, resp. rate 20, height 5' 3\" (1.6 m), weight 142 lb 13.7 oz (64.8 kg), SpO2 95 %, not currently breastfeeding. Vital signs are normal.    Output: Producing urine. HEENT: Normal HEENT exam.    Lungs:  Normal effort and normal respiratory rate. Breath sounds clear to auscultation. Heart: Normal rate. Regular rhythm. S1 normal and S2 normal.  Positive for murmur (1/6 alan). Abdomen: Abdomen is soft. ( Abdominal  Wall hernia  Noted ). Bowel sounds are normal.   There is no abdominal tenderness. Extremities: (Left  Leg everted and  With pain)  Neurological: Patient is alert. (  More  Confusion ). Skin:  Warm and dry. Assessment:    Condition: In stable condition. Unchanged. (  Left  Hip  Fracture and need  Of  Repair       gerd  Chronic     peripheral  Edema   Venous insufficiency at one time significant and with venous stasis ulcers now with less activity nursing home with more leg elevation improving    Hypertension stable    Hypothyroidism stable    Peripheral neuropathy remains stable    Alzheimer's dementia with gradually worsening along with significantly amount of hearing loss     ). Plan: Activity Plan: bedrest.  Clear liquid diet. Echocardiogram.  Administer medications as ordered. (  Advanced age clearly indicates increased risk of surgery. Chest x-ray clear and EKG stable    Will start low dose beta blocker for protection of heart and obtain echocardiogram to look at overall heart function    Remain off diuretics as these will be decreased regardless as her venous insufficiency is less    We'll continue to follow medically).        Gail Noonan MD  7/21/2020

## 2020-07-22 NOTE — PROGRESS NOTES
Orthopedic Progress Note    Tyson Gold  7/22/2020    Subjective:     Patient is a 80 y.o. female with a past medical history of dementia, Alzheimer's, HTN, Hypothyroid, osteopenia, GERD who ortho was consulted for left hip fracture. Patient is a poor historian, requires hearing aids that are at [facility]. She resides at [facility] and is mostly independent per nursing and patient's POA her niece. She ambulates with a walker at the nursing home and is able to mostly take care of her self with some assistance from faculty. Patient in bed in no acute distress, moans when awakened. Not communicating coherently. Per report from  The nursing home she has behaved this way in the past when she does not have her hearing aids in.       Objective:     BP (!) 143/62   Pulse 81   Temp 98.9 °F (37.2 °C) (Oral)   Resp 18   Ht 5' 3\" (1.6 m)   Wt 142 lb 13.7 oz (64.8 kg)   SpO2 94%   BMI 25.31 kg/m²     Intake/Output Summary (Last 24 hours) at 7/22/2020 0739  Last data filed at 7/22/2020 0432  Gross per 24 hour   Intake 1688.71 ml   Output 700 ml   Net 988.71 ml     DRAIN/TUBE OUTPUT:       General: Resting in bed, Alert but disoriented. Unable to communicate, may be in part due to not having hearing aids. Extremity: Skin intact. Log roll positive. TTP about the hip. Compartments soft. Intact DP and PT pulses. BCR. Pitting edema in calf and ankles.     DVT Exam: No evidence of DVT by physical exam     Data Review  CBC:   Lab Results   Component Value Date    WBC 5.3 07/20/2020    RBC 3.49 07/20/2020    RBC 3.33 04/26/2012    HGB 10.8 07/21/2020    HCT 34.0 07/21/2020     07/20/2020     BMP:    Lab Results   Component Value Date     07/21/2020    K 4.7 07/21/2020    CL 98 07/21/2020    CO2 33 07/21/2020    BUN 17 07/21/2020    CREATININE 0.9 07/21/2020    CALCIUM 8.7 07/21/2020    GLUCOSE 120 07/21/2020     PT/INR:    Lab Results   Component Value Date    INR 1.04 01/01/2015       Radiology: See

## 2020-07-22 NOTE — PROGRESS NOTES
Temp taken in holding area, 97.6f. Patient unable to answer questions and moaning. Joaquin Carranza, called and notified the procedure was beginning and anesthesia discussed patient care with POA. Consent obtained.

## 2020-07-22 NOTE — ANESTHESIA PRE PROCEDURE
Department of Anesthesiology  Preprocedure Note       Name:  Derek Desouza   Age:  80 y.o.  :  2/3/1921                                          MRN:  [id number]         Date:  2020      Surgeon: Omkar Hernández):  Jose Javier    Procedure: Procedure(s):  LEFT HIP Jerre Hope    Medications prior to admission:   Prior to Admission medications    Medication Sig Start Date End Date Taking? Authorizing Provider   famotidine (PEPCID) 20 MG tablet Take 20 mg by mouth daily   Yes Historical Provider, MD   divalproex (DEPAKOTE) 250 MG DR tablet Take 250 mg by mouth daily   Yes Historical Provider, MD   donepezil (ARICEPT) 10 MG tablet Take 10 mg by mouth nightly  18  Yes Historical Provider, MD   escitalopram (LEXAPRO) 5 MG tablet Take 5 mg by mouth daily  18  Yes Historical Provider, MD   levothyroxine (SYNTHROID) 25 MCG tablet Take 50 mcg by mouth Daily  18  Yes Historical Provider, MD   metolazone (ZAROXOLYN) 2.5 MG tablet Take 2.5 mg by mouth three times a week  18  Yes Historical Provider, MD   docusate sodium (COLACE) 100 MG capsule Take 100 mg by mouth 2 times daily    Yes Historical Provider, MD   ALPRAZolam (XANAX) 0.25 MG tablet Take 0.5 mg by mouth 3 times daily. 18  Yes Historical Provider, MD   bumetanide (BUMEX) 1 MG tablet take 1 tablet by mouth twice a day 17  Yes Emerald Verdugo MD   gabapentin (NEURONTIN) 100 MG capsule take 1 capsule by mouth twice a day 17  Yes Emerald Verdugo MD   pantoprazole (PROTONIX) 40 MG tablet take 1 tablet by mouth once daily 17  Yes Emerald Verdugo MD   polyethylene glycol (GLYCOLAX) powder take 17GM (DISSOLVED IN WATER) by mouth once daily 16  Yes Emerald Verdugo MD   loperamide (IMODIUM) 2 MG capsule Take 2 mg by mouth 4 times daily as needed for Diarrhea    Historical Provider, MD   miconazole (MICOTIN) 2 % powder Apply topically 3 times daily Apply topically 2 times daily.     Historical Provider, MD   bisacodyl 1756      fentaNYL (SUBLIMAZE) injection 25 mcg  25 mcg Intravenous Q1H PRN LEVI Shetty   25 mcg at 07/21/20 2109    Or    fentaNYL (SUBLIMAZE) injection 50 mcg  50 mcg Intravenous Q1H PRN LEVI Shetty   50 mcg at 07/22/20 0147    famotidine (PEPCID) injection 20 mg  20 mg Intravenous Daily LEVI Shetty   20 mg at 07/22/20 7001       Allergies: Allergies   Allergen Reactions    Aspirin Nausea Only    Pcn [Penicillins] Swelling     Throat swelling    Seroquel [Quetiapine]     Sulfa Antibiotics     Tetanus Toxoids Other (See Comments)     Edema of arm per patient    Prednisone Nausea And Vomiting       Problem List:    Patient Active Problem List   Diagnosis Code    Anxiety F41.9    Neuropathy G62.9    Hypertension I10    GERD (gastroesophageal reflux disease) K21.9    Hernia, ventral K43.9    Constipation K59.00    Osteoarthritis M19.90    Venous stasis of lower extremity I87.8    CKD (chronic kidney disease), stage III (HCC) N18.3    Hypothyroidism (acquired) E03.9    Pain in joint involving left ankle and foot M25.572    Gait instability R26.81    Peripheral edema R60.9    Weakness generalized R53.1    Hypokalemia E87.6    Impaired mobility and ADLs Z74.09    Sensorineural hearing loss (SNHL) of both ears H90.3    Moderate malnutrition ([facility]) E44.0    Closed left hip fracture, initial encounter ([facility] S72.002A    [facility]       Past Medical History:        Diagnosis Date    Alzheimer disease ([facility]     Anxiety     Chronic kidney disease     Cognitive communication deficit     Constipation     Delusion ([address]     Dysphagia     Fecal impaction ([facility])     GERD (gastroesophageal reflux disease)     GERD (gastroesophageal reflux disease)     Hernia, ventral     Hiatal hernia     Hypertension     Hypothyroid     Malnutrition ([address]     Movement disorder     Neuropathy     peripheral    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    Osteoarthritis     Peripheral edema Trevin Villalobos in the last 72 hours. Coags:   Lab Results   Component Value Date    INR 1.04 01/01/2015       HCG (If Applicable): No results found for: PREGTESTUR, PREGSERUM, HCG, HCGQUANT     ABGs: No results found for: PHART, PO2ART, GXX5DVG, YSG4RDN, BEART, M7RQHUEM     Type & Screen (If Applicable):  Lab Results   Component Value Date    LABRH POS 07/20/2020       Drug/Infectious Status (If Applicable):  No results found for: HIV, HEPCAB    COVID-19 Screening (If Applicable):   Lab Results   Component Value Date    COVID19 NOT DETECTED 07/21/2020         Anesthesia Evaluation  Patient summary reviewed and Nursing notes reviewed  Airway: Mallampati: Unable to assess / NA        Dental:          Pulmonary:   (+) decreased breath sounds,                            PE comment: 3L NC Cardiovascular:  Exercise tolerance: poor (<4 METS),   (+) hypertension:,       ECG reviewed  Rhythm: regular                      Neuro/Psych:   (+) neuromuscular disease:, psychiatric history:            GI/Hepatic/Renal:   (+) hiatal hernia, GERD:,           Endo/Other:                     Abdominal:       Abdomen: soft. Vascular:                                        Anesthesia Plan      general     ASA 4       Induction: intravenous. Anesthetic plan and risks discussed with healthcare power of . Use of blood products discussed with healthcare power of  whom.                    [facility],    7/22/2020

## 2020-07-22 NOTE — CARE COORDINATION
7/22/20, 2:09 PM EDT    [address] day: 2  Location: [facility]20/020- Reason for admit: [facility] [S72.002A]   Abnormal findings:  07/22  CT left hip  Comminuted displaced intertrochanteric fracture left femur detail above. Procedure: plans OR later today  Treatment Plan of Care: NPO after MN, N/V checks, pain control, bedrest, Hgb 9.7, neuro checks, wears hearing aids (not here), PT/OT on hold until after surgery. Barriers to Discharge: OR today once PCP clearance completed  PCP: Jonna Post MD  Readmission Risk Score: 13%  Patient Goals/Plan/Treatment Preferences: from [facility]; [facility].

## 2020-07-22 NOTE — PROGRESS NOTES
[address] [place] [id number]  PHYSICAL THERAPY MISSED TREATMENT NOTE  ACUTE CARE  [facility]              Missed Treatment  Pt remains on bedrest. Per ortho note from this AM, pt will require surgery. PT will sign off at this time. Please re-consult s/p surgical interventions and when pt is appropriate for OOB activity.

## 2020-07-22 NOTE — PLAN OF CARE
Problem: Pain:  Goal: Pain level will decrease  Description: Pain level will decrease  7/22/2020 0320 by Dusty Kellogg RN  Outcome: Ongoing  Note: Advanced Dementia scale utilized for pain as patient is unable to verbalize pain. Prn medications given for pain, ice applied to hip, and patient repositioned for comfort. 7/21/2020 1343 by Leyla Monsivais RN  Outcome: Ongoing  Note: Advanced dementia scale used to rate pain with a rating of 7-8 on 0-10 scale. PRN pain medication given as ordered along with ice and repositioning. Patient's stated pain goal is 5. Goal: Control of acute pain  Description: Control of acute pain  7/22/2020 0320 by Dusty Kellogg RN  Outcome: Ongoing  7/21/2020 1343 by Leyla Monsivais RN  Outcome: Ongoing  Note: Advanced dementia scale used to rate pain with a rating of 7-8 on 0-10 scale. PRN pain medication given as ordered along with ice and repositioning. Patient's stated pain goal is 5. Goal: Control of chronic pain  Description: Control of chronic pain  7/22/2020 0320 by Dusty Kellogg RN  Outcome: Ongoing  7/21/2020 1343 by Leyla Monsivais RN  Outcome: Ongoing  Note: Advanced dementia scale used to rate pain with a rating of 7-8 on 0-10 scale. PRN pain medication given as ordered along with ice and repositioning. Patient's stated pain goal is 5. Problem: Falls - Risk of:  Goal: Will remain free from falls  Description: Will remain free from falls  7/22/2020 0320 by Dusty Kellogg RN  Outcome: Ongoing  Note: Patient using call light appropriately to call for assistance. Patient is compliant with use of non-skid slippers. Patient reports understanding of fall prevention when discussed. Bed alarm remains in place. 7/21/2020 1343 by Leyla Monsivais RN  Outcome: Ongoing  Note: Patient has remained free of falls during this shift. Appropriate fall prevention measures in place. Patient is compliant with using call light for assistance when needed.     Goal: Absence of physical injury  Description: Absence of physical injury  7/22/2020 0320 by Aracelis White RN  Outcome: Ongoing  Note: Patient is free from physical injury at this time. 7/21/2020 1343 by John Jimenes RN  Outcome: Ongoing  Note: Patient has remained free of physical injury during this shift. Safe environment provided, call light within reach, and hourly rounding completed. Problem: Skin Integrity:  Goal: Will show no infection signs and symptoms  Description: Will show no infection signs and symptoms  7/22/2020 0320 by Aracelis White RN  Outcome: Ongoing  Note: Redness noted to buttocks and bilateral heels. Scattered bruising noted in various stages of healing. Scattered abrasions noted. Redness noted to bilateral eyes and drainage present. Patient understands the importance of frequent repositioning in order to prevent any skin breakdown. 7/21/2020 1343 by John Jimenes RN  Outcome: Ongoing  Note: No s/sx of infection noted during this shift. Patient has remained afebrile and VS stable. Goal: Absence of new skin breakdown  Description: Absence of new skin breakdown  7/22/2020 0320 by Aracelis White RN  Outcome: Ongoing  Note: No new skin breakdown noted at this time. 7/21/2020 1343 by John Jimenes RN  Outcome: Ongoing  Note: No new skin issues noted during this shift. See skin assessment. Patient has been repositioned every 2 hours as recommended. Goal: Demonstration of wound healing without infection will improve  Description: Demonstration of wound healing without infection will improve  Outcome: Ongoing  Goal: Risk for impaired skin integrity will decrease  Description: Risk for impaired skin integrity will decrease  7/22/2020 0320 by Aracelis White RN  Outcome: Ongoing  7/21/2020 1343 by John Jimenes RN  Outcome: Ongoing  Note: Patient remains a high risk for skin integrity due to decrease in movement. Problem:  Activity:  Goal: Ability to ambulate will improve  Description: Ability to ambulate will improve  7/22/2020 0320 by Toni Danielle RN  Outcome: Ongoing  Note: Patient remains on bedrest at this time pending surgery. 7/21/2020 1343 by Sandra Rojas RN  Outcome: Ongoing  Note: Patient is currently on bedrest. Therapy ordered once patient is off bedrest.      Problem: Physical Regulation:  Goal: Will remain free from infection  Description: Will remain free from infection  7/22/2020 0320 by Toni Danielle RN  Outcome: Ongoing  Note: Patient remains free from infection at this time. Will continue to monitor. 7/21/2020 1343 by Sandra Rojas RN  Outcome: Ongoing  Note: No s/sx of infection during this shift. Goal: Postoperative complications will be avoided or minimized  Description: Postoperative complications will be avoided or minimized  7/22/2020 0320 by Toni Danielle RN  Outcome: Ongoing  Note: Awaiting surgery at this time. 7/21/2020 1343 by Sandra Rojas RN  Outcome: Ongoing  Note: Patient is awaiting surgery decision. Problem: Respiratory:  Goal: Ability to maintain adequate ventilation will improve  Description: Ability to maintain adequate ventilation will improve  7/22/2020 0320 by Toni Danielle RN  Outcome: Ongoing  Note: Patient is 93% on 3L per nasal cannula. Diminished lung sounds noted. 7/21/2020 1343 by Sandra Rojas RN  Outcome: Ongoing  Note: Patient is currently on 4 L via NC with sats in the low 90s. Problem: Safety:  Goal: Ability to remain free from injury will improve  Description: Ability to remain free from injury will improve  7/22/2020 0320 by Toni Danielle RN  Outcome: Ongoing  Note: Patient remains free from injury at this time. 7/21/2020 1343 by Sandra Rojas RN  Outcome: Ongoing  Note: Patient has remained free of physical injury during this shift. Safe environment provided, call light within reach, and hourly rounding completed.         Problem: Tissue Perfusion:  Goal: Risk of venous thrombosis will decrease  Description: Risk of venous thrombosis will decrease  7/22/2020 0320 by Ricci Tarango RN  Outcome: Ongoing  Note: Patient unable to verbalize pain in BLE.   7/21/2020 1343 by Yassine Mann RN  Outcome: Ongoing  Note: No s/sx of DVT during this shift. Problem: Urinary Elimination:  Goal: Ability to reestablish a normal urinary elimination pattern will improve - after catheter removal  Description: Ability to reestablish a normal urinary elimination pattern will improve  7/22/2020 0320 by Ricci Tarango RN  Outcome: Ongoing  Note: Moran remains in place and is draining emeli and clear urine. 7/21/2020 1343 by Yassine Mann RN  Outcome: Ongoing  Note: Moran catheter present with adequate amounts of clear, emeli urine. Problem: Discharge Planning:  Goal: Discharged to appropriate level of care  Description: Discharged to appropriate level of care  7/22/2020 0320 by Ricci Tarango RN  Outcome: Ongoing  Note: Patient plans to return to [facility] at discharge.  assisting with discharge planning.  7/21/2020 1343 by Yassine Mann RN  Outcome: Ongoing  Note: Discharge planning in progress. Patient is planning to return to [facility] at discharge. Case management assisting with discharge needs. Problem: DISCHARGE BARRIERS  Goal: Patient's continuum of care needs are met  Outcome: Ongoing  Note: Patient plans to return to [facility] at discharge.  assisting with discharge planning. Problem: Nutrition  Goal: Optimal nutrition therapy  Description: Nutrition Problem #1: Inadequate oral intake  Intervention: Food and/or Nutrient Delivery: Modify Current Diet(texture per SLP when cleared by Physician)  Nutritional        7/22/2020 0320 by Ricci Tarango RN  Outcome: Ongoing  Note: Patient is currently NPO at this time for surgery. 7/21/2020 1437 by Ruby Bosch  Outcome: Ongoing    Care plan reviewed with patient.   Patient unable to verbalize understanding of the plan of care or contribute to goal setting.

## 2020-07-22 NOTE — PROGRESS NOTES
Toni Ceja  Daily Progress Note      Pt Name: Liang Kim  Medical Record Number: [id number]  Date of Birth 2/3/1921   Today's Date: 7/22/2020    HD: # 2    CC: ENRIQUE. BRANDY. ASSESSMENT  1. Active Hospital Problems    Diagnosis Date Noted    Fall [W19. XXXA]     Closed left hip fracture, initial encounter ([facility] [address] Joey Wrightrigan 07/20/2020         PLAN  Patient admitted under Trauma Services      Unwitnessed fall              -PT/OT eval and treat if/when able              -Neuro checks              -SLP cog eval if able     Left hip fracture              - Ortho managing - unclear for surgical plans at this time              - Pain control              - Neurovascular checks   - CT scan left hip today today showed comminuted displaced intertrochanteric fracture of left femur   - Ortho noted will decide on surgical intervention following CT scan   - Per ortho plan for OR today for left hip Intertan, NPO and hold AC    Hx HTN, PVD, neuropathy, malnutrition, hypothyroid, Alzheimer's   - PCP assisting with management   - PCP started low dose beta blocker and Echo ordered   - PCP to continue to follow medically     Pain Management              -Fentanyl, Norflex     Prophylaxis: SCD's, Incentive Spirometry, Glycolax, Pepcid, Zofran     NPO      IVF Management  Regular Neurovascular Checks  Repeat Labs Tomorrow AM  PT/OT/SLP Eval and Treat if/when able  Bedrest      Planned Discharge pending clinical course    - From [facility]. Plan to return      SUBJECTIVE  Patient seen on 7K. Patient was awake but did not talk or really follow any commands. Patient did perform  strength in bilateral hands but did not follow any other commands. Patient just moans on exam. Per report patient is not able to verbalize due to underlying Alzheimer's/dementia. Patient's abdomen was soft with hypoactive bowel sounds. Distal pulses intact.   Patient did move her feet when palpating for distal pulses. CT of the left hip today showed comminuted displaced intertrochanteric fracture of the left femur. Per nursing communication note orthopedic surgery plans on OR today for left hip Intertan. PCP following for assistance with medical management. Plan to update and discuss case with trauma surgeon, Dr. Mazin Day. Wt Readings from Last 3 Encounters:   07/21/20 142 lb 13.7 oz (64.8 kg)   04/22/20 146 lb (66.2 kg)   08/24/18 135 lb (61.2 kg)     Temp Readings from Last 3 Encounters:   07/22/20 98.3 °F (36.8 °C) (Oral)   04/22/20 98 °F (36.7 °C)   01/31/18 97.5 °F (36.4 °C) (Oral)     BP Readings from Last 3 Encounters:   07/22/20 (!) 118/56   04/22/20 120/78   10/04/19 118/63     Pulse Readings from Last 3 Encounters:   07/22/20 81   04/22/20 82   10/04/19 64       24 HR INTAKE/OUTPUT :     Intake/Output Summary (Last 24 hours) at 7/22/2020 0907  Last data filed at 7/22/2020 0432  Gross per 24 hour   Intake 1688.71 ml   Output 700 ml   Net 988.71 ml     Diet NPO, After Midnight    OBJECTIVE  CURRENT VITALS BP (!) 118/56   Pulse 81   Temp 98.3 °F (36.8 °C) (Oral)   Resp 18   Ht 5' 3\" (1.6 m)   Wt 142 lb 13.7 oz (64.8 kg)   SpO2 91%   BMI 25.31 kg/m²        GENERAL: Awake, no acute distress, Moans when touched or moved. Nonverbal.   NEURO: PERRL,  Patient did perform  strength in bilateral hands but did not follow any other commands.  strength weak but equal bilaterally. Patient did move her feet when palpating for distal pulses. No obvious signs of focal neurological deficits noted on exam.  LUNGS: clear to auscultation bilaterally- no wheezes, rales or rhonchi, normal air movement, no respiratory distress. Lung sounds diminished in bases. HEART: normal rate, normal S1 and S2, no obvious murmurs, rubs, or gallops, intact distal pulses. ABDOMEN: soft, non-distended, hypoactive bowel sounds, no masses or organomegaly  EXTREMITY:  Moved feet when palpating distal pulses.  No cyanosis. LABS  CBC :   Recent Labs     07/20/20  1645 07/21/20  0720 07/22/20  0731   WBC 5.3  --  6.2   HGB 11.2* 10.8* 9.7*   HCT 35.2* 34.0* 30.5*   .9*  --  101.7*     --  162     BMP:   Recent Labs     07/20/20  1645 07/21/20  0720 07/22/20  0731    141 144   K 3.9 4.7 3.8   CL 98 98 104   CO2 30 33 32   BUN 19 17 17   CREATININE 0.8 0.9 1.0     COAGS:   Recent Labs     07/21/20  0720   PROT 7.2     Pancreas/HFP:  No results for input(s): LIPASE, AMYLASE in the last 72 hours. Recent Labs     07/21/20  0720   *   *   BILITOT 1.0   ALKPHOS 207*       RADIOLOGY:  Narrative    PROCEDURE: CT HIP LEFT WO CONTRAST         CLINICAL INFORMATION: hip fracture .         COMPARISON: No prior study.         TECHNIQUE: 2-D multiplanar noncontrast images of the left hip bone and soft tissue windows are submitted. All CT scans at this facility use dose modulation, iterative reconstruction, and/or weight-based dosing when appropriate to reduce radiation dose to as low as reasonably achievable.         FINDINGS: There is a comminuted intertrochanteric fracture of the left hip. There is lateral angulation of the femoral neck. There is impaction of the femoral neck to the femoral shaft. There is posterior inferior distraction of the greater trochanter. There is fracture extending through the lesser trochanter. Minimal surrounding hematoma. Visualized portion of the pelvis demonstrates Moran catheter decompressing the bladder. No intrapelvic hematoma is identified. No additional fractures are seen.              Impression    Comminuted displaced intertrochanteric fracture left femur detail above.                   **This report has been created using voice recognition software.  It may contain minor errors which are inherent in voice recognition technology. **         Final report electronically signed by Dr. Kristen Liriano on 7/22/2020 8:49 AM           Electronically signed by Susanna Smith

## 2020-07-22 NOTE — CARE COORDINATION
7/22/20, 11:54 AM EDT    DISCHARGE PLANNING EVALUATION      Spoke with Herbie roldan, who is POA. She confirms plan for return to [facility] at discharge.

## 2020-07-23 NOTE — PROGRESS NOTES
Dennis Nation  Daily Progress Note      Pt Name: Piper Maguire  Medical Record Number: [id number]  Date of Birth 2/3/1921   Today's Date: 7/23/2020    HD: # 3    CC: ENRIQUE. BRANDY. ASSESSMENT  1. Active Hospital Problems    Diagnosis Date Noted    Fall [W19. XXXA]     Closed left hip fracture, initial encounter [facility]) Ji Rings 07/20/2020       PROCEDURES  07/22/20 - Cephalomedullary nailing left hip by Dr. Matt Curtis  Patient admitted under [address] fall              -PT/OT eval and treat if/when able              -Neuro checks              -SLP cog eval if able     Left hip fracture              - Ortho managing - S/P repair on 7/22              - Pain control              - Neurovascular checks    Hx HTN, PVD, neuropathy, malnutrition, hypothyroid, Alzheimer's   - PCP assisting with management     Pain Management              -Fentanyl, Norflex     Prophylaxis: SCD's, Incentive Spirometry, Glycolax, Pepcid, Zofran     Diet as tolerated   - Palliative care consulted, pt not taking PO, not following commands. ?goal of care     IVF Management until taking PO well  Regular Neurovascular Checks  Repeat Labs Tomorrow AM  PT/OT/SLP Eval and Treat if/when able  Up with assistance, as tolerated     Planned Discharge pending clinical course    - From [facility]. Plan to return      SUBJECTIVE  Patient seen on 7K. Patient was awake but did not talk or really follow any commands. Maisha Cramer only moans. Per report patient is not able to verbalize due to underlying Alzheimer's/dementia. Surgery went as expected. Patient is not compliant with therapies or taking in a diet due to Alzheimer's/dementia. Palliative care consulted to assist with goals of care. May need hospice as she is already a [facility] so feeding tube is likely not an option. Plan to update and discuss case with trauma surgeon, Dr. Mazin Day.       Wt Readings from Last 3 Encounters: 07/21/20 142 lb 13.7 oz (64.8 kg)   04/22/20 146 lb (66.2 kg)   08/24/18 135 lb (61.2 kg)     Temp Readings from Last 3 Encounters:   07/23/20 98 °F (36.7 °C) (Axillary)   07/22/20 96.8 °F (36 °C)   04/22/20 98 °F (36.7 °C)     BP Readings from Last 3 Encounters:   07/23/20 (!) 103/45   07/22/20 (!) 161/101   04/22/20 120/78     Pulse Readings from Last 3 Encounters:   07/23/20 72   04/22/20 82   10/04/19 64       24 HR INTAKE/OUTPUT :     Intake/Output Summary (Last 24 hours) at 7/23/2020 0829  Last data filed at 7/23/2020 0618  Gross per 24 hour   Intake 1858.55 ml   Output 420 ml   Net 1438.55 ml     DIET CARDIAC;    OBJECTIVE  CURRENT VITALS BP (!) 103/45   Pulse 72   Temp 98 °F (36.7 °C) (Axillary)   Resp 16   Ht 5' 3\" (1.6 m)   Wt 142 lb 13.7 oz (64.8 kg)   SpO2 (!) 83%   BMI 25.31 kg/m²        GENERAL: Awake, no acute distress, Moans when touched or moved. Nonverbal.   NEURO: PERRL,  Does not follow commands. No obvious signs of focal neurological deficits noted on exam.  LUNGS: clear to auscultation bilaterally- no wheezes, rales or rhonchi, normal air movement, no respiratory distress. Lung sounds diminished in bases. HEART: normal rate, normal S1 and S2, no obvious murmurs, rubs, or gallops, intact distal pulses. ABDOMEN: soft, non-distended, hypoactive bowel sounds, no masses or organomegaly  WOUNDS: Surgical incisions are clean, dry and intact. No s/s of infection  EXTREMITY:  Moved feet when palpating distal pulses. No cyanosis.     LABS  CBC :   Recent Labs     07/20/20  1645 07/21/20  0720 07/22/20  0731 07/23/20  0718   WBC 5.3  --  6.2 7.2   HGB 11.2* 10.8* 9.7* 7.7*   HCT 35.2* 34.0* 30.5* 24.8*   .9*  --  101.7* 104.6*     --  162 173     BMP:   Recent Labs     07/20/20  1645 07/21/20  0720 07/22/20  0731    141 144   K 3.9 4.7 3.8   CL 98 98 104   CO2 30 33 32   BUN 19 17 17   CREATININE 0.8 0.9 1.0     COAGS:   Recent Labs     07/21/20  0720   PROT 7.2 Pancreas/HFP:  No results for input(s): LIPASE, AMYLASE in the last 72 hours. Recent Labs     07/21/20  0720   *   *   BILITOT 1.0   ALKPHOS 207*       RADIOLOGY:  No new results        Electronically signed by CARMEN Gates - CNP on 7/23/2020 at 8:29 AM     Patient seen and examined independently by me early this AM. Above discussed and I agree with Galdino Moran CNP. See my additional comments below for updated orders and plan. Labs, cultures, and radiographs where available were reviewed. I discussed patient concerns with the patient's nurse and instructions were given. Please see our orders for the updated patient care plan. -Status post repair yesterday. Acute blood loss anemia expected. Repeat hemoglobin in a.m. No signs of active bleeding this morning. DVT prophylaxis. Therapy as able. Advancing diet but patient not taking in much oral food. Palliative care consulted. Eventually to [facility] pending clinical course and post operative improvements.     Electronically signed by José Antonio Bermudez MD on 7/23/20 at 3:43 PM EDT

## 2020-07-23 NOTE — PROGRESS NOTES
Physician Progress Note      Bart Benson  CSN #:                  [id number]  :                       2/3/1921  ADMIT DATE:       2020 4:05 PM  [address] [place] DATE:  RESPONDING  PROVIDER #:        Manda Sparks CNP          QUERY TEXT:    Pt admitted with left hip fracture . Pt noted to have reported oxygen   saturations of 58 % requiring non-rebreather on admission to ER . If possible,   please document in the progress notes and discharge summary if you are   evaluating and/or treating any of the following: The medical record reflects the following:  Risk Factors: Advanced age, dementia, fall with acute injury, daily   medications  Clinical Indicators: documentation of oxygen saturation of 58% requiring   non-rebreather on transport and admission to ED, oxygen saturations remaining   91-94 % on 3-4 liters - no ABGs drawn  Treatment: non-rebreather with oxygen 10liters weaned down to 3 liters to keep   O2 saturations greater than 92%    Thank You,  Sushila Nair RN, BSN, [address] [place] [address]   P: [phone]  F: [phone]  Options provided:  -- Acute respiratory failure with hypoxia present on admission  -- Other - I will add my own diagnosis  -- Disagree - Clinically unable to determine / Unknown  -- Refer to Clinical Documentation Reviewer    PROVIDER RESPONSE TEXT:    This patient is in acute respiratory failure with hypoxia present on admission   .     Query created by: Jose Maria Jackson on 2020 7:12 AM      Electronically signed by:  Manda Sparks CNP 2020 7:55 AM

## 2020-07-23 NOTE — PROGRESS NOTES
2032 Pt transferred to PACU, see flow sheet for assessment. 2045 Pt not following commands and will become agitated when in contact with her. Breathing unlabored.    2100 Report called to [facility]  2103 Pt transferred to [address] [place] [address]

## 2020-07-23 NOTE — PROGRESS NOTES
Tim Knott is a 80 y.o. female patient.     Current Facility-Administered Medications   Medication Dose Route Frequency Provider Last Rate Last Dose    metoprolol (LOPRESSOR) injection 2.5 mg  2.5 mg Intravenous Q8H Pritesh Mckee PA-C   2.5 mg at 07/22/20 2332    ciprofloxacin (CILOXAN) 0.3 % ophthalmic solution 1 drop  1 drop Both Eyes 4x Daily ALEJANDRO Glez   1 drop at 07/22/20 2149    acetaminophen (TYLENOL) suppository 650 mg  650 mg Rectal Q6H Pritesh Mckee PA-C   650 mg at 07/22/20 2323    sodium chloride flush 0.9 % injection 10 mL  10 mL Intravenous 2 times per day ALEJANDRO Glez        sodium chloride flush 0.9 % injection 10 mL  10 mL Intravenous PRN ALEJANDRO Glez        ceFAZolin (ANCEF) 1 g in dextrose 5 % 50 mL IVPB (premix)  1 g Intravenous Q8H Pritesh Mckee PA-C        polyethylene glycol (GLYCOLAX) packet 17 g  17 g Oral Daily ALEJANDRO Glez        bisacodyl (DULCOLAX) suppository 10 mg  10 mg Rectal Daily PRN ALEJANDRO Glez        fleet rectal enema 1 enema  1 enema Rectal Daily PRN ALEJANDRO Glez        aspirin EC tablet 81 mg  81 mg Oral Daily ALEJANDRO Glez        HYDROmorphone (DILAUDID) injection 0.5 mg  0.5 mg Intravenous Q3H PRN ALEJANDRO Glez   0.5 mg at 07/22/20 2146    polyethylene glycol (GLYCOLAX) packet 17 g  17 g Oral Daily PRN Pritesh Mckee PA-C        promethazine (PHENERGAN) tablet 12.5 mg  12.5 mg Oral Q6H PRN Pritesh Mckee PA-C        Or    ondansetron (ZOFRAN) injection 4 mg  4 mg Intravenous Q6H PRN Pritesh Mckee PA-C        0.9 % sodium chloride infusion   Intravenous Continuous ALEJANDRO Glez 75 mL/hr at 07/22/20 1756      famotidine (PEPCID) injection 20 mg  20 mg Intravenous Daily Pritesh Mckee PA-C   20 mg at 07/22/20 9618     Allergies   Allergen Reactions    Aspirin Nausea Only    Pcn [Penicillins] Swelling     Throat swelling    Seroquel [Quetiapine]     Sulfa Antibiotics     Tetanus Toxoids Other (See Comments)     Edema of arm per patient  Prednisone Nausea And Vomiting     Active Problems:    Closed left hip fracture, initial encounter [facility])    Fall  Resolved Problems:    * No resolved hospital problems. *    Blood pressure 117/61, pulse 84, temperature 97.5 °F (36.4 °C), temperature source Axillary, resp. rate 18, height 5' 3\" (1.6 m), weight 142 lb 13.7 oz (64.8 kg), SpO2 96 %, not currently breastfeeding. Subjective:  Symptoms:  Stable. Diet:  Poor intake. Activity level: Impaired due to pain. Pain:  She complains of pain that is mild. Pain is well controlled. Objective:  General Appearance: In no acute distress (some left  hip pain). Vital signs: (most recent): Blood pressure 117/61, pulse 84, temperature 97.5 °F (36.4 °C), temperature source Axillary, resp. rate 18, height 5' 3\" (1.6 m), weight 142 lb 13.7 oz (64.8 kg), SpO2 96 %, not currently breastfeeding. Vital signs are normal.    Output: Producing urine ( reyes in place). HEENT: Normal HEENT exam.    Lungs:  Normal effort and normal respiratory rate. Breath sounds clear to auscultation. Heart: Normal rate. Regular rhythm. S1 normal and S2 normal.  Positive for murmur (1 to 2/6 alan). Abdomen: Abdomen is soft. Bowel sounds are normal.   There is no abdominal tenderness. Extremities: Decreased range of motion. (Left hip pain)  Neurological: Patient is alert. (Confusion and responds to name only     refusing all meds ). Skin:  Warm and dry. Assessment:    Condition: In stable condition. Unchanged. (Left hip fracture and needs repair and needs consent    dementia and with the confusion resists all care and refusing pills      pain control adequate      htn stable      venous insufficiency stable with legs up and off diuretics     hypothyroid on meds     ). Plan: Activity Plan: bedrest and left hip with ice. Respiratory Plan: continue oxygen. Consults: ortho. Sips/ice chips (possible surgery today).   Administer medications as ordered. ( Giving  meds iv as refuse po      dementia worse along with the pain     to iv lopressor to protect heart and for bp      on telemetry     hope surgery later today as hydrated and labs stable ekg and cxr ok    Would clinch with arms so no echo jst to see lv function but clinically stable  And same  Treatment with beta blocker for anderson -op period    ).        Richard Guerrero MD  7/23/2020

## 2020-07-23 NOTE — ANESTHESIA POSTPROCEDURE EVALUATION
Department of Anesthesiology  Postprocedure Note    Patient: Rae Mahmood  MRN: [id number]  YOB: 1921  Date of evaluation: 7/23/2020  Time:  6:33 AM     Procedure Summary     Date:  07/22/20 Room / Location:  [facility] 03 / [address] OR    Anesthesia Start:  1911 Anesthesia Stop:  2040    Procedure:  LEFT HIP INTERTAN (Left Hip) Diagnosis:  (LEFT HIP FRACTURE)    Surgeon:  Candice Avila Responsible Provider:  China Cifuentes DO    Anesthesia Type:  general ASA Status:  4          Anesthesia Type: general    Ivana Phase I: Ivana Score: 9    Ivana Phase II:      Last vitals: Reviewed and per EMR flowsheets.        Anesthesia Post Evaluation    Patient location during evaluation: PACU  Patient participation: complete - patient cannot participate  Level of consciousness: agitated  Airway patency: patent  Nausea & Vomiting: no nausea and no vomiting  Complications: no  Cardiovascular status: hemodynamically stable  Respiratory status: acceptable  Hydration status: stable

## 2020-07-23 NOTE — OP NOTE
site was identified and marked. Informed consent was obtained from her [address] [place] [address]. The patient was then brought to the operating room and general anesthesia was administered. She was then placed supine on the operating table. A well padded perineal post was placed. Both feet were well padded and the left leg was placed in traction. Preop fluoroscopy confirmed a successful closed reduction. All bony prominences were identified and padded. The operative leg was prepped and draped in the usual sterile fashion. A surgical timeout was performed. Antibiotics were confirmed to have been given. A 3 cm longitudinal incision was made in line with the femur, proximal to the tip of the greater trochanter. Sharp dissection was performed through skin and through deep fascia. The 3.2mm guide pin was placed at tip of the greater trochanter under AP and lateral fluoroscopic guidance. The greater trochanter was displaced posteriorly as a separate fragment. The guide pin was advanced into the greater trochanter and advanced then into the proximal femoral canal. The pin was confirmed to be in appropriate position fluoroscopically. The entry reamer was advanced over the guide pin to accept the nail. The preloaded short Intertan nail was then inserted, and after confirmed to be intramedullary, the proximal femur was prepared for the lag and worm screws with excellent compression achieved. The distal screw was placed through the outrigger. Final films were obtained and saved. All incisions were irrigated and closed in a layered fashion. Sterile occlussive dressings were applied. Once the patient was awakened from anesthesia, they were transported to the PACU in stable condition, having tolerated surgery well with no immediate complications.     Postoperative Plan  WBAT  Abx x 24hrs  DVT prophylaxis with ASA 81 mg daily in this patient  Follow up 3 wks with left hip xrays      This operative report was prepared and signed by Emily Robert at 07/22/20, 8:38 PM

## 2020-07-23 NOTE — PLAN OF CARE
Problem: Pain:  Goal: Pain level will decrease  Description: Pain level will decrease  Outcome: Ongoing  Note:   Advanced Dementia scale utilized for pain as patient is unable to verbalize pain. Prn medications given for pain, ice applied to hip, and patient repositioned for comfort     Problem: Pain:  Goal: Control of acute pain  Description: Control of acute pain  Outcome: Ongoing  Note:   Advanced Dementia scale utilized for pain as patient is unable to verbalize pain. Prn medications given for pain, ice applied to hip, and patient repositioned for comfort     Problem: Pain:  Goal: Control of chronic pain  Description: Control of chronic pain  Outcome: Ongoing  Note:   Advanced Dementia scale utilized for pain as patient is unable to verbalize pain. Prn medications given for pain, ice applied to hip, and patient repositioned for comfort     Problem: Falls - Risk of:  Goal: Will remain free from falls  Description: Will remain free from falls  Outcome: Ongoing  Note:    Patient has remained free of physical injury during this shift. Safe environment provided, call light within reach, and hourly rounding completed. Problem: Falls - Risk of:  Goal: Absence of physical injury  Description: Absence of physical injury  Outcome: Ongoing  Note:    Patient has remained free of physical injury during this shift. Safe environment provided, call light within reach, and hourly rounding completed. Problem: Skin Integrity:  Goal: Will show no infection signs and symptoms  Description: Will show no infection signs and symptoms  Outcome: Ongoing  Note: Pt exhibits no s/s of infection at this time     Problem: Skin Integrity:  Goal: Absence of new skin breakdown  Description: Absence of new skin breakdown  Outcome: Ongoing  Note: Hourly rounding completed. Turning pt Q2. No evidence of new breakdown noted.       Problem: Skin Integrity:  Goal: Demonstration of wound healing without infection will improve  Outcome: Ongoing  Note: Urinary catheter currently in place with no complications      Problem: Discharge Planning:  Goal: Discharged to appropriate level of care  Description: Discharged to appropriate level of care  Outcome: Ongoing  Note: Pt plans ECF at discharge. Care manager and social working helping with discharge needs. Problem: DISCHARGE BARRIERS  Goal: Patient's continuum of care needs are met  7/23/2020 0106 by Ji Modi RN  Outcome: Ongoing  Note: Pt care needs are being met     Problem: Nutrition  Goal: Optimal nutrition therapy  Description: Nutrition Problem #1: Inadequate oral intake  Intervention: Food and/or Nutrient Delivery: Modify Current Diet(texture per SLP when cleared by Physician)  Nutritional        Outcome: Ongoing  Note: Pt exhibits poor nutrition     Care plan reviewed with patient. Patient verbalize understanding of the plan of care and contribute to goal setting.

## 2020-07-23 NOTE — PLAN OF CARE
Problem: Pain:  Goal: Pain level will decrease  Description: Pain level will decrease  7/23/2020 1413 by Malik Nation RN  Outcome: Ongoing  Note: Patient screams when being touched. Does not follow commands. Morphine for pain with relief. Problem: Falls - Risk of:  Goal: Will remain free from falls  Description: Will remain free from falls  7/23/2020 1413 by Malik Nation RN  Outcome: Ongoing  Note: No falls this shift. Does not work with therapy     Problem: Skin Integrity:  Goal: Will show no infection signs and symptoms  Description: Will show no infection signs and symptoms  7/23/2020 1413 by Malik Nation RN  Outcome: Ongoing  Note: Afebrile. None noted. Problem: Skin Integrity:  Goal: Absence of new skin breakdown  Description: Absence of new skin breakdown  7/23/2020 1413 by Malik Nation RN  Outcome: Ongoing  Note: Turn  q2 and prn. Elevate feet     Problem: Skin Integrity:  Goal: Demonstration of wound healing without infection will improve  Description: Demonstration of wound healing without infection will improve  7/23/2020 1413 by Malik Nation RN  Outcome: Ongoing  Note: Epc cream     Problem: Activity:  Goal: Ability to ambulate will improve  Description: Ability to ambulate will improve  7/23/2020 1413 by Malik Nation RN  Outcome: Ongoing  Note: Therapy ordered. Problem: Physical Regulation:  Goal: Postoperative complications will be avoided or minimized  Description: Postoperative complications will be avoided or minimized  7/23/2020 1413 by Malik Nation RN  Outcome: Ongoing  Note: No new issues noted. Problem: Respiratory:  Goal: Ability to maintain adequate ventilation will improve  Description: Ability to maintain adequate ventilation will improve  7/23/2020 1413 by Malik Nation RN  Outcome: Ongoing  Note: Adequate sats on 2L via NC.       Problem: Safety:  Goal: Ability to remain free from injury will improve  Description: Ability to remain free from injury will improve  7/23/2020 1413 by Claribel Mace RN  Outcome: Ongoing  Note: No injury this shift. Problem: Tissue Perfusion:  Goal: Risk of venous thrombosis will decrease  Description: Risk of venous thrombosis will decrease  7/23/2020 1413 by Claribel Mace RN  Outcome: Ongoing  Note: Scds in use. Problem: Urinary Elimination:  Goal: Ability to reestablish a normal urinary elimination pattern will improve - after catheter removal  Description: Ability to reestablish a normal urinary elimination pattern will improve  7/23/2020 1413 by Claribel Mace RN  Outcome: Ongoing  Note: Moran continues. Problem: Discharge Planning:  Goal: Discharged to appropriate level of care  Description: Discharged to appropriate level of care  7/23/2020 1413 by Claribel Mace RN  Outcome: Ongoing  Note: Discharge back to ecf when stable. Problem: DISCHARGE BARRIERS  Goal: Patient's continuum of care needs are met  7/23/2020 1413 by Claribel Mace RN  Outcome: Ongoing  Note: Discharge to e[facility]. Palliative care consult in. Problem: Nutrition  Goal: Optimal nutrition therapy  Description: Nutrition Problem #1: Inadequate oral intake  Intervention: Food and/or Nutrient Delivery: Modify Current Diet(texture per SLP when cleared by Physician)  Nutritional        7/23/2020 1413 by Claribel Mace RN  Outcome: Ongoing  Note: No meals eaten this shift. Patient refusing.       Problem: Pain:  Goal: Control of acute pain  Description: Control of acute pain  7/23/2020 1413 by Claribel Mace RN  Outcome: Completed     Problem: Pain:  Goal: Control of chronic pain  Description: Control of chronic pain  7/23/2020 1413 by Claribel Mace RN  Outcome: Completed     Problem: Falls - Risk of:  Goal: Absence of physical injury  Description: Absence of physical injury  7/23/2020 1413 by Claribel Mace RN  Outcome: Completed     Problem: Skin Integrity:  Goal: Risk for impaired skin integrity will decrease  Description: Risk for impaired skin integrity will decrease  7/23/2020 1413 by Jean Sterling RN  Outcome: Completed     Problem: Physical Regulation:  Goal: Will remain free from infection  Description: Will remain free from infection  7/23/2020 1413 by Jean Sterling RN  Outcome: Completed    Care plan reviewed with patient and family. Patient and family verbalize understanding of the plan of care and contribute to goal setting.

## 2020-07-23 NOTE — PROGRESS NOTES
[address] [place] [address]  PHYSICAL THERAPY MISSED TREATMENT NOTE  ACUTE CARE  [facility]K              Missed Treatment  Holding PT evaluation. Pt at this time is not following commands and is very resistive in bed. Will re-attempt evaluation tomorrow.      Time spent: 3138-0694

## 2020-07-23 NOTE — PROGRESS NOTES
Orthopedic Progress Note    Margot Lacy  7/23/2020    Subjective:     Post-Operative Day # 1 s/p left hip cephalomedullary nail for basicervical femoral neck fracture. .    Systemic or Specific Complaints: Patient demented and without hearing aids that are required for communication. Less agitated today. Objective:     BP (!) 103/45   Pulse 72   Temp 98 °F (36.7 °C) (Axillary)   Resp 16   Ht 5' 3\" (1.6 m)   Wt 142 lb 13.7 oz (64.8 kg)   SpO2 (!) 83%   BMI 25.31 kg/m²     Intake/Output Summary (Last 24 hours) at 7/23/2020 0721  Last data filed at 7/23/2020 0618  Gross per 24 hour   Intake 1858.55 ml   Output 420 ml   Net 1438.55 ml     DRAIN/TUBE OUTPUT:       General: Resting in bed. Alert and disoriented. No verbal communication. May be in part due to not having hearing aids. Wound: Dressing clean dry and intact. Extremity: Tolerates some hip motion and log roll. Intact DP and PT pulses. BCR. Spontaneously moves toes. Doesn't respond to comands or able to assess sensation. DVT Exam: No evidence of DVT by physical exam     Data Review  CBC:   Lab Results   Component Value Date    WBC 6.2 07/22/2020    RBC 3.00 07/22/2020    RBC 3.33 04/26/2012    HGB 9.7 07/22/2020    HCT 30.5 07/22/2020     07/22/2020     BMP:    Lab Results   Component Value Date     07/22/2020    K 3.8 07/22/2020    K 4.7 07/21/2020     07/22/2020    CO2 32 07/22/2020    BUN 17 07/22/2020    CREATININE 1.0 07/22/2020    CALCIUM 8.5 07/22/2020    GLUCOSE 106 07/22/2020     PT/INR:    Lab Results   Component Value Date    INR 1.04 01/01/2015       Radiology: See electronic record to view reports. Reports reviewed. Assessment:     Status Post left hip cephalomedullary nail for basicervical femoral neck fracture surgery, doing well, stable. Labs pending. Plan:      1: Medical management per trauma and PCP.     2:  Continue pain control  3:  ASA 81mg DVT ppx  4:  PT/OT as able  5: WBAT   6:  Plan DC back to nursing home when stable.      Electronically signed by Elizabeth White PA-C on 7/23/2020 at 7:21 AM

## 2020-07-23 NOTE — CARE COORDINATION
7/23/20, 9:47 AM EDT    DISCHARGE ON GOING EVALUATION    [facility] day: 3  Location: 7K-20/020-A Reason for admit: Closed left hip fracture, initial encounter [facility]) [S72.002A]   Procedure: 7/22/20 surgery by Dr Courtney Reed:   Maeve Kelley of Care: PT/OT. Pain management. N/V checks. I&O. DNRCC. Up with assistance. Stop fluids when eating and drinking OK. Barriers to Discharge: POD 1. Needs medical stability. PT/OT.    PCP: Sunitha Madden MD  Readmission Risk Score: 14%  Patient Goals/Plan/Treatment Preferences: SW on case for return to [facility]

## 2020-07-24 NOTE — PROGRESS NOTES
[address] [place] [address]  INPATIENT PHYSICAL THERAPY  EVALUATION  [facility] 7K - 7K-20/020-A    Time In: 1010  Time Out: 1028  Timed Code Treatment Minutes: 8 Minutes  Minutes: 18          Date: 2020  Patient Name: Belén Painting,  Gender:  female        MRN: [id number]  : 2/3/1921  (80 y.o.)      Referring Practitioner: Liliane Soriano PA-C  Diagnosis: Closedleft hip fx  Additional Pertinent Hx: Pt admitted from NH  after a fall . Pt hadsx - forORIF left femur     Restrictions/Precautions:  Restrictions/Precautions: Weight Bearing, Fall Risk  Left Lower Extremity Weight Bearing: Weight Bearing As Tolerated  Position Activity Restriction  Other position/activity restrictions: ORIF left hip. WBAT,dementia    Subjective:  Chart Reviewed: Yes  Patient assessed for rehabilitation services?: Yes  Family / Caregiver Present: No  Subjective: Pt in bed, eyes open throughout. Pt did not follow commands. She did allow some ROM but then got  agitated . So no edge of bed done. Pt doesnot verbalize . General:  Overall Orientation Status: (did not verbalize or follow commands)  Follows Commands: Impaired         Hearing: (unable to assess,pt does not verbalize or follow commands)         Pain: unable togive due to dementia/10: groanedwith ROM left hip    Social/Functional History:    Type of Home: Facility  Home Layout: One level  Home Access: Level entry            Additional Comments: per RN, Pt was ambualting on her own at NH, but has dementia    OBJECTIVE:  Range of Motion:  Bilateral Lower Extremity: WFL    Strength:  Bilateral Lower Extremity: Impaired - Pt did do active DF/PF on both -grossly 3-/5, AAROM knees and ankles with modto max A dueto pts confusion     Balance:  Static Sitting Balance:  NT    Bed Mobility:  Not Tested    Transfers:  Not Tested      Exercise:  Patient was guided in 1 set(s) 5-10  reps of exercise to both lower extremities.   Ankle pumps, Heelslides, Hip abduction/adduction and and passive DF  stretch on both held 20 seconds X 3. Exercises were completed for increased independence with functional mobility. Functional Outcome Measures: Completed  AM-PAC Inpatient Mobility without Stair Climbing Raw Score : 6  AM-PAC Inpatient without Stair Climbing T-Scale Score : 26.48    ASSESSMENT:  Activity Tolerance:  Patient tolerance of  treatment: fair. Treatment Initiated: Treatment and education initiated within context of evaluation. Evaluation time included review of current medical information, gathering information related to past medical, social and functional history, completion of standardized testing, formal and informal observation of tasks, assessment of data and development of plan of care and goals. Treatment time included skilled education and facilitation of tasks to increase safety and independence with functional mobility for improved independence and quality of life. Assessment: Body structures, Functions, Activity limitations: Decreased functional mobility , Decreased endurance, Decreased safe awareness, Decreased cognition, Decreased strength, Decreased ROM  Assessment: Pt with dementia and pain in left LE due to sx ,has decreased Funtional mobility,decreased activity tolerance . Pt needs PT to improve safety with mobility. Will assess bed to chair when safe to do so.   Prognosis: Fair    REQUIRES PT FOLLOW UP: Yes    Discharge Recommendations:  Discharge Recommendations: Continue to assess pending progress, Patient would benefit from continued therapy after discharge    Patient Education:  PT Education: PT Role, Plan of Care    Equipment Recommendations:  Equipment Needed: No    Plan:  Times per week: 5-6 X O  Specific instructions for Next Treatment: assess mobility sat or sun if pt able  Current Treatment Recommendations: Strengthening, ROM, Functional Mobility Training, Endurance Training    Goals:  Patient goals : Unable to give  Short term goals  Time Frame for Short term goals: by discharge  Short term goal 1: Pt to complete 10 reps AAROM both LEs with Min A to imporve strength for function  Short term goal 2: assess mobility as able  Long term goals  Time Frame for Long term goals : NA due to short ELOS    Following session, patient left in safe position with all fall risk precautions in place.

## 2020-07-24 NOTE — CARE COORDINATION
7/24/20, 1:53 PM EDT    DISCHARGE PLANNING EVALUATION      Spoke with [facility] admissions to update on discharge anticipated for tomorrow. A covid test is not required for return.

## 2020-07-24 NOTE — PROGRESS NOTES
[address] [facility] THERAPY MISSED TREATMENT NOTE  [facility] 7K  7K-20/020-A      Date: 2020  Patient Name: Liang Kim        CSN: [id number]   : 2/3/1921  (80 y.o.)  Gender: female                REASON FOR MISSED TREATMENT: Pt receiving blood transfusion. Will check back as time allows.

## 2020-07-24 NOTE — PROGRESS NOTES
tolerated. Administer medications as ordered. (  Transfuse      hope  In am  Back  To  [facility]  And may  Need  Hospice ).        Kimberly Lopez MD  7/24/2020

## 2020-07-24 NOTE — PROGRESS NOTES
Jody Collins is a 80 y.o. female patient.     Current Facility-Administered Medications   Medication Dose Route Frequency Provider Last Rate Last Dose    morphine (PF) injection 1 mg  1 mg Intravenous Q4H PRN Gail Noonan MD   1 mg at 07/23/20 1831    Or    morphine (PF) injection 2 mg  2 mg Intravenous Q4H PRN Gail Noonan MD        acetaminophen (TYLENOL) tablet 650 mg  650 mg Oral Q4H PRN Gial Noonan MD        metoprolol (LOPRESSOR) injection 2.5 mg  2.5 mg Intravenous Q8H Pritesh Mckee PA-C   2.5 mg at 07/23/20 1533    ciprofloxacin (CILOXAN) 0.3 % ophthalmic solution 1 drop  1 drop Both Eyes 4x Daily ALEJANDRO Glez   1 drop at 07/23/20 2010    acetaminophen (TYLENOL) suppository 650 mg  650 mg Rectal Q6H Pritesh Mckee PA-C   650 mg at 07/23/20 0919    sodium chloride flush 0.9 % injection 10 mL  10 mL Intravenous 2 times per day ALEJANDRO Glez        sodium chloride flush 0.9 % injection 10 mL  10 mL Intravenous PRN ALEJANDRO Glez        polyethylene glycol (GLYCOLAX) packet 17 g  17 g Oral Daily ALEJANDRO Glez        bisacodyl (DULCOLAX) suppository 10 mg  10 mg Rectal Daily PRN ALEJANDRO Glez        fleet rectal enema 1 enema  1 enema Rectal Daily PRN ALEJANDRO Glez        aspirin EC tablet 81 mg  81 mg Oral Daily LAEJANDRO Glez        HYDROmorphone (DILAUDID) injection 0.5 mg  0.5 mg Intravenous Q3H PRN ALEJANDRO Glez   0.5 mg at 07/22/20 2146    polyethylene glycol (GLYCOLAX) packet 17 g  17 g Oral Daily PRN ALEJANDRO Glez        promethazine (PHENERGAN) tablet 12.5 mg  12.5 mg Oral Q6H PRN ALEJANDRO Glez        Or    ondansetron (ZOFRAN) injection 4 mg  4 mg Intravenous Q6H PRN ALEJANDRO Glez        0.9 % sodium chloride infusion   Intravenous Continuous Gail Noonan MD 50 mL/hr at 07/23/20 1803      famotidine (PEPCID) injection 20 mg  20 mg Intravenous Daily Pritesh Mckee PA-C   20 mg at 07/23/20 0901     Allergies   Allergen Reactions    Aspirin Nausea Only    Pcn [Penicillins] Swelling     Throat swelling    Seroquel [Quetiapine]     Sulfa Antibiotics     Tetanus Toxoids Other (See Comments)     Edema of arm per patient    Prednisone Nausea And Vomiting     Active Problems:    Closed left hip fracture, initial encounter [facility])    Fall  Resolved Problems:    * No resolved hospital problems. *    Blood pressure (!) 152/69, pulse 67, temperature 98.1 °F (36.7 °C), temperature source Axillary, resp. rate 16, height 5' 3\" (1.6 m), weight 142 lb 13.7 oz (64.8 kg), SpO2 95 %, not currently breastfeeding. Subjective:  Symptoms:  Stable. Activity level: Impaired due to weakness. Pain:  She complains of pain that is mild. ( Feel irritability due to pain). Objective:  General Appearance:  Comfortable. Vital signs: (most recent): Blood pressure (!) 152/69, pulse 67, temperature 98.1 °F (36.7 °C), temperature source Axillary, resp. rate 16, height 5' 3\" (1.6 m), weight 142 lb 13.7 oz (64.8 kg), SpO2 95 %, not currently breastfeeding. Vital signs are normal.    Output: Producing urine and producing stool. HEENT: Normal HEENT exam.    Lungs:  Normal effort and normal respiratory rate. Breath sounds clear to auscultation. Heart: Normal rate. Regular rhythm. S1 normal and S2 normal.  Positive for murmur (2/6 alan). Abdomen: Abdomen is soft. Bowel sounds are normal.   There is no abdominal tenderness. Extremities: Normal range of motion. (Left  Hip pain noted and with ice )  Neurological: Patient is alert and oriented to person, place and time. Normal strength. Skin:  Warm and dry. Assessment:    Condition: In stable condition. Improving. (Left  Hip post repair stable     alzheimers and more confusion noted and feel pain  related      anemia  Post op  Blood loss      gerd stable      venous  Insufficiency stable      hypothyroid stable      severe hearing  Loss  Noted ). Plan:   Per physical therapy.   Wean off oxygen. Consults: physical therapy, occupational therapy and . Advance diet as tolerated. Administer medications as ordered. (  Stable  Post  Op  And use  Iv pain meds      hope  Once  Eating and drinking  Back to nursing home      advance  Diet      decrease  Iv rate ).        Winnie Closs, MD  7/23/2020

## 2020-07-24 NOTE — PLAN OF CARE
Problem: Pain:  Goal: Pain level will decrease  Description: Pain level will decrease  7/24/2020 0115 by Alexandra Tran RN  Outcome: Ongoing  Note: Advanced Dementia scale utilized for pain as patient is unable to verbalize pain. Prn medications given for pain, ice applied to hip, and patient repositioned for comfort      Problem: Falls - Risk of:  Goal: Will remain free from falls  Description: Will remain free from falls  7/24/2020 0115 by Alexandra Tran RN  Outcome: Ongoing  Note: Hourly rounding on pt. Fall precautions in place. Problem: Skin Integrity:  Goal: Will show no infection signs and symptoms  Description: Will show no infection signs and symptoms  7/24/2020 0115 by Alexandra Tran RN  Outcome: Ongoing  Note: Pt shows no s/s of infection at this time         Problem: Skin Integrity:  Goal: Absence of new skin breakdown  Description: Absence of new skin breakdown  7/24/2020 0115 by Alexandra Tran RN  Outcome: Ongoing  Note: Pt turned Q2. No evidence of additional skin breakdown      Problem: Skin Integrity:  Goal: Demonstration of wound healing without infection will improve  Description: Demonstration of wound healing without infection will improve  7/24/2020 0115 by Alexandra Tran RN  Outcome: Ongoing  Note: Pt exhibits no s/s of infection at this time        Problem: Activity:  Goal: Ability to ambulate will improve  Description: Ability to ambulate will improve  7/24/2020 0115 by Alexandra Tran RN  Outcome: Ongoing  Note: Pt refuse to get out of bed at this time.  Weight bearing as tolerated ordered     Problem: Physical Regulation:  Goal: Postoperative complications will be avoided or minimized  Description: Postoperative complications will be avoided or minimized  7/24/2020 0115 by Alexandra Tran RN  Outcome: Ongoing  Note: Pt exhibits no s/s of post op complications      Problem: Respiratory:  Goal: Ability to maintain adequate ventilation will improve  Description: Ability to maintain adequate ventilation will improve  7/24/2020 0115 by Treasure Salvador RN  Outcome: Ongoing  Note: Pt o2 sats vary while agitated vs calm. Interventions in place to keep above 90     Problem: Safety:  Goal: Ability to remain free from injury will improve  Description: Ability to remain free from injury will improve  7/24/2020 0115 by Treasure Salvador RN  Outcome: Ongoing  Note: Pt free from injury at this time       Problem: Tissue Perfusion:  Goal: Risk of venous thrombosis will decrease  Description: Risk of venous thrombosis will decrease  7/24/2020 0115 by Treasure Salvador RN  Outcome: Ongoing  Note: Pt wearing SCD's while in bed     Problem: Urinary Elimination:  Goal: Ability to reestablish a normal urinary elimination pattern will improve - after catheter removal  Description: Ability to reestablish a normal urinary elimination pattern will improve  7/24/2020 0115 by Treasure Salvador RN  Outcome: Ongoing  Note: Pt currently has reyes in place with adequate output     Problem: Discharge Planning:  Goal: Discharged to appropriate level of care  Description: Discharged to appropriate level of care  7/24/2020 0115 by Treasure Salvador RN  Outcome: Ongoing  Note: Pt plans ecf at discharge. Care manager and social working helping with discharge needs.        Problem: DISCHARGE BARRIERS  Goal: Patient's continuum of care needs are met  7/24/2020 0115 by Treasure Salvador RN  Outcome: Ongoing  Note: Pt needs are being met     Problem: Nutrition  Goal: Optimal nutrition therapy  Description: Nutrition Problem #1: Inadequate oral intake  Intervention: Food and/or Nutrient Delivery: Modify Current Diet(texture per SLP when cleared by Physician)  Nutritional          Problem: Nutrition  Goal: Optimal nutrition therapy  Description: Nutrition Problem #1: Inadequate oral intake  Intervention: Food and/or Nutrient Delivery: Modify Current Diet(texture per SLP when cleared by Physician)  Nutritional 7/24/2020 0115 by Phil Martinez RN  Outcome: Ongoing  Note: Pt exhibits poor nutrition    Care plan reviewed with patient. Patient verbalize understanding of the plan of care and contribute to goal setting.

## 2020-07-24 NOTE — PROGRESS NOTES
Michael Kelly Likes  Daily Progress Note    Pt Name: Kenji Daily  Medical Record Number: [id number]  Date of Birth 2/3/1921   Today's Date: 7/24/2020    HD: # 4    CC: VIOLETA NAVA. ASSESSMENT    Active Hospital Problems    Diagnosis Date Noted    Fall [W19. XXXA]     Closed left hip fracture, initial encounter [facility]) Richard Jalloh 07/20/2020       PROCEDURES  07/22/20 - Cephalomedullary nailing left hip by Dr. Hollis Alicia  Patient admitted under Trauma Services      Unwitnessed fall   - Fall risk precautions              - PT/OT eval and treat if/when able              - SLP cog eval if able              - Neuro checks    Left hip fracture              - Ortho managing - S/P repair on 7/22   - Ortho signing off today, follow up with Dr. Vera Burk in 3 weeks   - Hgb 6.8 this AM, 1 unit PRBCs ordered per Dr. Walker Pereira   - Neurovascular checks              - Pain control    Acute blood loss anemia   - Hgb 6.8 this AM   - 1 unit PRBCs ordered per Dr. Walker Pereira   - Repeat labs   - Continue to monitor               Hx HTN, PVD, neuropathy, malnutrition, hypothyroid, Alzheimer's   - PCP assisting with management      Pain Management              - Tylenol, Morphine, Dilaudid     Prophylaxis: SCD's, 81mg ASA, Incentive Spirometry, Glycolax, Pepcid, Zofran     Diet as tolerated   - Palliative care consulted, pt not taking PO, not following commands.     IVF Management until taking PO well  Regular Neurovascular Checks  Repeat Labs Tomorrow AM  PT/OT/SLP Eval and Treat if/when able  Up with assistance, as tolerated     Planned Discharge pending clinical course    - From [facility]. Plans to return.      SUBJECTIVE  Patient seen on 7K this morning. Patient is awake, pulling at clothing and moaning. Patient remains confused. Does not offer complaints or follow commands. Per report patient is not able to verbalize due to underlying Alzheimer's/dementia.  Patient is POD #2 S/p Left hip distress. Lung sounds diminished in bases. HEART: Normal rate, normal S1 and S2, no obvious murmurs, rubs, or gallops, intact distal pulses. ABDOMEN: Soft, non-distended, hypoactive bowel sounds, no masses or organomegaly. WOUNDS: Dressings to surgical incisions are clean, dry and intact. No s/s of infection. EXTREMITY: No cyanosis. LABS  CBC :   Recent Labs     07/22/20  0731 07/23/20  0718 07/24/20  0711   WBC 6.2 7.2 4.8   HGB 9.7* 7.7* 6.8*   HCT 30.5* 24.8* 21.7*   .7* 104.6* 104.3*    173 202     BMP:   Recent Labs     07/22/20  0731 07/23/20  0718 07/24/20  0711    148* 147*   K 3.8 3.9 3.4*    109 111   CO2 32 27 26   BUN 17 27* 24*   CREATININE 1.0 1.1 0.9     COAGS:   No results for input(s): APTT, PROT, INR in the last 72 hours. Pancreas/HFP:  No results for input(s): LIPASE, AMYLASE in the last 72 hours. No results for input(s): AST, ALT, BILIDIR, BILITOT, ALKPHOS in the last 72 hours. RADIOLOGY:  No new results       Electronically signed by Evelina Betancourt PA-C on 7/24/2020 at 11:35 AM     Patient seen and examined independently by me early this AM. Above discussed and I agree with LEVI Ruiz. See my additional comments below for updated orders and plan. Labs, cultures, and radiographs where available were reviewed. I discussed patient concerns with the patient's nurse and instructions were given. Please see our orders for the updated patient care plan. -Hemoglobin 6.8 today. Transfuse 1 unit. Pain control. PT/OT as able. Neurovascular checks. Orthopedic service completed repair and planning outpatient follow-up in 3 weeks. Pain control. Repeat labs in a.m. No signs of active bleeding on clinical exam today. Discharge planning in process but most likely ECF.     Electronically signed by Yoanna Xavier MD on 7/24/20 at 3:33 PM EDT

## 2020-07-24 NOTE — PROGRESS NOTES
DVT ppx, TEDS, SCDs  4:  PT/OT as able  5: WBAT   6:  Plan DC back to nursing home when stable. 7:  F/U with Dr. Abby Molina in 3 weeks.     Electronically signed by Jailyn Viveros PA-C on 7/24/2020 at 10:27 AM

## 2020-07-24 NOTE — PROGRESS NOTES
Palliative care eval received to assist with goals of care. Pt admitted after unwitnessed fall, she fractured her right hip. Pt to OR on 7-22 for nailing. Pt's code status is DNR-CC. Case discussed with pt's nurse,MONICA Rainey. Per his report, pt has been screaming with any care, refusing eat. Rylee Contreras reports that pt is more alert today. Writer in room, pt is awake and alert, sitting upright in bed. Pt tracks and focuses but no verbalization. RN used thermometer for axillary temp and pt immediately cries out, \" I don't want that\". Rylee Contreras is able to calm pt. Writer placed call to pt's niece and Estefania Hamilton. Pt overall condition reviewed. Mitchell Crenshaw states that she has not seen pt since April, due to [place] pandemic. Mitchell Crenshaw states that prior to that, pt was able to participate fairly well in conversation but she didn't always recognize family. Current DNR-CC code status reviewed, we discussed ongoing therapy for pt and pain control. Hospice care also discussed as an option if pt continues to refuse to eat, Mitchell Crenshaw states that pt will NOT have any type of feeding tube placed. We discussed how hospice care looks at the [facility]. Writer also spoke with Dr Allan Mcginnis regarding plan of care. Dr Allan Mcginnis would like to have pt return to [facility] with code status DNR-CC and then transition to hospice care once pt is more definitive to stop therapy and any other active treatment. Dr Allan Mcginnis requests palliative care to follow pt at [facility]. Writer reported Dr [place] opinion to Mitchell Decremaximo, she states understanding and agreement. Plan will likely be discharged back to [facility] in the next day or two. Code status will be DNR-CC, with the goal that pt will not be returned to hospital but transition to hospice when appropriate. Mitchell Crenshaw states understanding and states no further questions or needs. Emotional support given. Pt's nurse, Rylee Contreras and Klaudia Hooker with ortho is on unit, both updated.   Palliative care will remain available, floor to notify for further needs.

## 2020-07-25 NOTE — PROGRESS NOTES
Patient: Saniya Tony  Unit/Bed: 7K-20/020-A  YOB: 1921  MRN: [id number] Acct: [de-identified]   Admitting Diagnosis: Closed left hip fracture, initial encounter [facility]) Scout Acosta  Admit Date:  7/20/2020  Hospital Day: 5    Assessment:     Active Problems:    Closed left hip fracture, initial encounter [facility])    Fall  Resolved Problems:    * No resolved hospital problems. *      Plan:     She seems confused consistent with the history of dementia  The potassium was low this AM and was replaced per protocal and can be followed up upon there  The staff will check with Dr Darrick Ferrer to see if she is ok for the return to [facility] today        Subjective:     Patient does not answer my questions regarding CP, SOB or Abd pains  Medication side effects: none    Scheduled Meds:   potassium replacement protocol   Other RX Placeholder    metoprolol  2.5 mg Intravenous Q8H    ciprofloxacin  1 drop Both Eyes 4x Daily    acetaminophen  650 mg Rectal Q6H    sodium chloride flush  10 mL Intravenous 2 times per day    polyethylene glycol  17 g Oral Daily    aspirin  81 mg Oral Daily    famotidine (PEPCID) injection  20 mg Intravenous Daily     Continuous Infusions:   sodium chloride 50 mL/hr at 07/23/20 1803     PRN Meds:potassium chloride, morphine **OR** morphine, acetaminophen, sodium chloride flush, bisacodyl, fleet, HYDROmorphone, polyethylene glycol, promethazine **OR** ondansetron    Review of Systems  Pertinent items are noted in HPI. Objective:     Patient Vitals for the past 8 hrs:   BP Temp Temp src Pulse Resp SpO2   07/25/20 0805 -- -- -- -- -- 92 %   07/25/20 0800 (!) 116/56 98.7 °F (37.1 °C) Oral 75 18 (!) 85 %   07/25/20 0741 -- -- -- -- -- 91 %     I/O last 3 completed shifts: In: 1783.1 [P.O.:60; I.V.:1363.1; Blood:360]  Out: 1325 [GLHVN:4062]  No intake/output data recorded.     BP (!) 116/56   Pulse 75   Temp 98.7 °F (37.1 °C) (Oral)   Resp 18   Ht 5' 3\" (1.6 m)   Wt 149 lb 11.1 oz (67.9 kg)   SpO2 92%   BMI 26.52 kg/m²     BP (!) 116/56   Pulse 75   Temp 98.7 °F (37.1 °C) (Oral)   Resp 18   Ht 5' 3\" (1.6 m)   Wt 149 lb 11.1 oz (67.9 kg)   SpO2 92%   BMI 26.52 kg/m²   General appearance: appears stated age and no distress  Head: Normocephalic, without obvious abnormality, atraumatic  Lungs: clear to auscultation bilaterally  Heart: regular rate and rhythm, S1, S2 normal, no murmur, click, rub or gallop  Abdomen: soft, non-tender; bowel sounds normal; no masses,  no organomegaly  Extremities: edema trace to the distal lower legs  Skin: Skin color, texture, turgor normal. No rashes or lesions  Neurologic: confused and moving all extremities    Data Review:   Results for Yue Garcia (MRN [id number]) as of 7/25/2020 13:58   Ref.  Range 7/24/2020 07:11 7/24/2020 09:05 7/24/2020 15:32 7/25/2020 07:10   Sodium Latest Ref Range: 135 - 145 meq/L 147 (H)   149 (H)   Potassium Latest Ref Range: 3.5 - 5.2 meq/L 3.4 (L)   2.9 (L)   Chloride Latest Ref Range: 98 - 111 meq/L 111   111   CO2 Latest Ref Range: 23 - 33 meq/L 26   29   BUN Latest Ref Range: 7 - 22 mg/dL 24 (H)   25 (H)   Creatinine Latest Ref Range: 0.4 - 1.2 mg/dL 0.9   0.8   Anion Gap Latest Ref Range: 8.0 - 16.0 meq/L 10.0   9.0   Est, Glom Filt Rate Latest Units: ml/min/1.73m2 58 (A)   66 (A)   Glucose Latest Ref Range: 70 - 108 mg/dL 98   100   Calcium Latest Ref Range: 8.5 - 10.5 mg/dL 8.3 (L)   8.5   WBC Latest Ref Range: 4.8 - 10.8 thou/mm3 4.8   9.7   RBC Latest Ref Range: 4.20 - 5.40 mill/mm3 2.08 (L)   2.58 (L)   Hemoglobin Quant Latest Ref Range: 12.0 - 16.0 gm/dl 6.8 (LL)  8.8 (L) 8.2 (L)   Hematocrit Latest Ref Range: 37.0 - 47.0 % 21.7 (L)  28.3 (L) 25.6 (L)   MCV Latest Ref Range: 81.0 - 99.0 fL 104.3 (H)   99.2 (H)   MCH Latest Ref Range: 26.0 - 33.0 pg 32.7   31.8   MCHC Latest Ref Range: 32.2 - 35.5 gm/dl 31.3 (L)   32.0 (L)   MPV Latest Ref Range: 9.4 - 12.4 fL 11.0   10.5   RDW-CV Latest Ref Range: 11.5 - 14.5 %

## 2020-07-25 NOTE — PLAN OF CARE
Problem: Pain:  Goal: Pain level will decrease  Description: Pain level will decrease  Outcome: Ongoing  Note: Pain Assessment: Advanced Dementia  Pain Level: 8   Patient's Stated Pain Goal: Other (Comment)(pt unable to state)   Is pain goal met at this time? No   Patient is screaming in pain. PRN morphine given. No evidence of relief. Repositioning patient to improve pain control. Will monitor. Non-Pharmaceutical Pain Intervention(s): Rest, Repositioned, Emotional support, Distraction      Problem: Falls - Risk of:  Goal: Will remain free from falls  Description: Will remain free from falls  Outcome: Ongoing  Note: Patient remained free of falls this shift. Fall precautions in place. Items within reach, call light within reach and side rails up x2. Bed alarm is on. Hourly rounding in place. Patient verbalizes understanding of using call light to ask for assistance as needed. Will monitor. Problem: Skin Integrity:  Goal: Will show no infection signs and symptoms  Description: Will show no infection signs and symptoms  Outcome: Ongoing  Note: Dressing dry and intact. Surgical incision is intact with prineo and surgical glue. No fevers, tachycardia, hypotension noted. Will monitor. Goal: Absence of new skin breakdown  Description: Absence of new skin breakdown  Outcome: Ongoing  Note: Pt has left hip surgical incision which is healing. Dressing is dry and intact (Prineo and surgical glue). No new skin impairments noted. Turning and repositioning patient every 2 hours to prevent skin breakdown. Patient already has bruising/discoloration on coccyx and a stage 2 to buttock. EPC cream in use. Will monitor. Goal: Demonstration of wound healing without infection will improve  Description: Demonstration of wound healing without infection will improve  Outcome: Ongoing  Note: Pt has left hip surgical incision which is healing. Dressing is dry and intact (Prineo and surgical glue). No s/s of infection noted.  No new skin impairments noted. Turning and repositioning patient every 2 hours to prevent skin breakdown. Patient already has bruising/discoloration on coccyx and a stage 2 to buttock. EPC cream in use. Will monitor. Problem: Activity:  Goal: Ability to ambulate will improve  Description: Ability to ambulate will improve  Outcome: Ongoing  Note: Patient has not been out of bed since surgery. PT/OT attempting to work with patient but patient is non-compliant and just screams \"No!\" Turning and repositioning patient every 2 hours to prevent skin breakdown. Will monitor. Problem: Physical Regulation:  Goal: Postoperative complications will be avoided or minimized  Description: Postoperative complications will be avoided or minimized  Outcome: Ongoing  Note: No s/s of post-op complications noted. Will monitor. Problem: Respiratory:  Goal: Ability to maintain adequate ventilation will improve  Description: Ability to maintain adequate ventilation will improve  Outcome: Ongoing  Note: Pt sats 94% on 2 LO2 per nasal cannula. Patient was found without oxygen on during first assessment and was at 79%. Lungs diminished. Will monitor. Problem: Safety:  Goal: Ability to remain free from injury will improve  Description: Ability to remain free from injury will improve  Outcome: Ongoing  Note: Patient remained free of injury so far this shift. Turning and repositioning patient every 2 hours to prevent skin breakdown. Will monitor. Problem: Tissue Perfusion:  Goal: Risk of venous thrombosis will decrease  Description: Risk of venous thrombosis will decrease  Outcome: Ongoing  Note: Patient refusing SCDs. She screams \"No, no. I don't want that. \" No current s/s of DVT present. Will monitor.       Problem: Urinary Elimination:  Goal: Ability to reestablish a normal urinary elimination pattern will improve - after catheter removal  Description: Ability to reestablish a normal urinary elimination pattern will improve  Outcome: Ongoing  Note: Patient has reyes catheter in place. Voiding small amounts via reyes. Will monitor. Problem: Discharge Planning:  Goal: Discharged to appropriate level of care  Description: Discharged to appropriate level of care  Outcome: Ongoing  Note: Patient is planning discharge to [facility]. No COVID testing required. Will monitor. Problem: DISCHARGE BARRIERS  Goal: Patient's continuum of care needs are met  Outcome: Ongoing  Note: Patient is planning discharge to [facility]. No COVID testing required. Will monitor. Problem: Nutrition  Goal: Optimal nutrition therapy  Description: Nutrition Problem #1: Inadequate oral intake  Intervention: Food and/or Nutrient Delivery: Modify Current Diet(texture per SLP when cleared by Physician)  Nutritional        Outcome: Ongoing  Note: Poor oral intake noted. Patient has only ate 30-40% of meals per day shift RN. He states that today is the first time she has ate since admission. Will monitor. Care plan reviewed with patient. Patient verbalize understanding of the plan of care and contribute to goal setting.

## 2020-07-25 NOTE — PROGRESS NOTES
Aisha Willis  Daily Progress Note    Pt Name: Enedina Farmer  Medical Record Number: [id number]  Date of Birth 2/3/1921   Today's Date: 7/25/2020    HD: # 5    CC: VIOLETA NAVA. ASSESSMENT    Active Hospital Problems    Diagnosis Date Noted    Fall [W19. XXXA]     Closed left hip fracture, initial encounter [facility]) Abhay Marines 07/20/2020       PROCEDURES  07/22/20 - Cephalomedullary nailing left hip by Dr. Christiano Delgado  Patient admitted under Trauma Services      Unwitnessed fall   - Fall risk precautions              - PT/OT eval and treat if/when able              - SLP cog eval if able              - Neuro checks    Left hip fracture              - Ortho managing - S/P repair on 7/22   - Ortho signed off, follow up with Dr. Kera Ambriz in 3 weeks   - Neurovascular checks              - Pain control   - WBAT   - PT/OT as able   - ASA 81mg for DVT ppx, TEDS, and SCDs    Acute blood loss anemia   - Hgb 6.8 yesterday AM   - 1 unit PRBCs ordered per Dr. Nadege Helms   - Repeat hgb this AM 8.2   - Continue to monitor               Hx HTN, PVD, neuropathy, malnutrition, hypothyroid, Alzheimer's   - PCP assisting with management    Hypokalemia, Hypernatremia   - PCP managing   - Replace per protocol    Pain Management              - Tylenol, Morphine, Dilaudid     Prophylaxis: SCD's, 81mg ASA, Incentive Spirometry, Glycolax, Pepcid, Zofran     Diet as tolerated   - Palliative care consulted, pt not taking PO, not following commands.     IVF Management until taking PO well  Regular Neurovascular Checks  PT/OT/SLP Eval and Treat if/when able  Up with assistance, as tolerated     Planned Discharge pending clinical course    - From [facility]. Plans to return. - Stable from trauma perspective for discharge back to [facility] today      SUBJECTIVE  Patient seen on 7K this morning. Patient is awake but remains confused. Does not offer complaints and does not follow commands.  Patient with incomprehensible speech and moves all four extremities spontaneously. Per report patient is not able to verbalize due to underlying Alzheimer's/dementia. Patient is POD #3 S/p Left hip cephalomedullary nail for basicervical femoral neck fracture with Dr. Nikolas Saavedra. Orthopedics signed off, stated pt can follow up outpatient with Dr. Nikolas Saavedra in 3 weeks. Recommend WBAT, ASA 81mg DVT ppx, and PT/OT as tolerated. Pt Hgb 6.8 yesterday AM. 1 unit PRBCs ordered per Dr. Kelle Arana. Hgb was 8.2 this AM. No clinical signs of bleeding. Hypokalemia noted and patient received replacement. Bowel movement documented. Per report patient is having a difficult time complying with therapies and eating due to dementia. Palliative care was consulted to assist with goals of care. Per Palliative care note, Dr Kelle Arana would like to have pt return to [facility] with code status DNR-CC and then transition to hospice care once pt is more definitive to stop therapy and any other active treatment. Dr Kelle Arana requests palliative care to follow pt at [facility]. Patient will be discharged to [facility] today, PCP, Dr. Yolette Chou, to do discharge per nursing. Okay for discharge from trauma perspective. Case discussed with trauma surgeon, Dr. Mary Holguin.       Wt Readings from Last 3 Encounters:   07/25/20 149 lb 11.1 oz (67.9 kg)   04/22/20 146 lb (66.2 kg)   08/24/18 135 lb (61.2 kg)     Temp Readings from Last 3 Encounters:   07/25/20 98.3 °F (36.8 °C) (Axillary)   07/22/20 96.8 °F (36 °C)   04/22/20 98 °F (36.7 °C)     BP Readings from Last 3 Encounters:   07/25/20 121/75   07/22/20 (!) 161/101   04/22/20 120/78     Pulse Readings from Last 3 Encounters:   07/25/20 70   04/22/20 82   10/04/19 64       24 HR INTAKE/OUTPUT :     Intake/Output Summary (Last 24 hours) at 7/25/2020 0843  Last data filed at 7/25/2020 0359  Gross per 24 hour   Intake 1723.12 ml   Output 1325 ml   Net 398.12 ml     DIET DYSPHAGIA MINCED AND MOIST;    OBJECTIVE  CURRENT VITALS /75   Pulse 70 Temp 98.3 °F (36.8 °C) (Axillary)   Resp 14   Ht 5' 3\" (1.6 m)   Wt 149 lb 11.1 oz (67.9 kg)   SpO2 91%   BMI 26.52 kg/m²        GENERAL: Awake, talking randomly, incomprehensible speech, NAD, does not respond to questions  NEURO: PERRL. Moves all extremities spontaneously, does not follow commands, no focal neurological deficits noted. LUNGS: Clear to auscultation bilaterally - no wheezes, rales or rhonchi, normal air movement, no respiratory distress. Lung sounds diminished in bases. HEART: Normal rate, normal S1 and S2, no obvious murmurs, rubs, or gallops, intact distal pulses. ABDOMEN: Soft, non-distended, normal active bowel sounds, no masses or organomegaly. WOUNDS: Dressings to surgical incisions are clean, dry and intact. EXTREMITY: No cyanosis. No edema. Moves all extremities spontaneously    LABS  CBC :   Recent Labs     07/23/20  0718 07/24/20  0711 07/24/20  1532 07/25/20  0710   WBC 7.2 4.8  --  9.7   HGB 7.7* 6.8* 8.8* 8.2*   HCT 24.8* 21.7* 28.3* 25.6*   .6* 104.3*  --  99.2*    202  --  212     BMP:   Recent Labs     07/23/20  0718 07/24/20  0711 07/25/20  0710   * 147* 149*   K 3.9 3.4* 2.9*    111 111   CO2 27 26 29   BUN 27* 24* 25*   CREATININE 1.1 0.9 0.8     COAGS:   No results for input(s): APTT, PROT, INR in the last 72 hours. Pancreas/HFP:  No results for input(s): LIPASE, AMYLASE in the last 72 hours. No results for input(s): AST, ALT, BILIDIR, BILITOT, ALKPHOS in the last 72 hours. RADIOLOGY:  No new results       Electronically signed by Orly Love PA-C on 7/25/2020 at 8:52 AM     Patient seen and examined independently by me early this AM. Above discussed and I agree with Orly Love CNP. See my additional comments below for updated orders and plan. Labs, cultures, and radiographs where available were reviewed. I discussed patient concerns with the patient's nurse and instructions were given.  Please see our orders for the updated patient care plan.  -Hemoglobin stable. No signs of active bleeding. Planning ECF today. Follow-up in trauma clinic as needed. Following up with orthopedic service in 3 weeks. Aspirin. Palliative care on board. Follow-up with PCP.     Electronically signed by Leticia Roberts MD on 7/25/20 at 8:17 PM EDT

## 2020-07-27 NOTE — CARE COORDINATION
7/27/20, 7:49 AM EDT    DISCHARGE PLANNING EVALUATION      Discharged to [facility] . 7/27/20, 7:49 AM EDT    Patient goals/plan/ treatment preferences discussed by  and . Patient goals/plan/ treatment preferences reviewed with patient/ family. Patient/ family verbalize understanding of discharge plan and are in agreement with goal/plan/treatment preferences. Understanding was demonstrated using the teach back method. AVS provided by RN at time of discharge, which includes all necessary medical information pertaining to the patients current course of illness, treatment, post-discharge goals of care, and treatment preferences.     Services After Discharge  Services At/After Discharge: Nursing Services, In ambulance, Skilled Therapy, Aide Services(Karen Morales)   IMM Letter  IMM Letter given to Patient/Family/Significant other/Guardian/POA/by[de-identified] lance  IMM Letter date given[de-identified] 07/24/20  IMM Letter time given[de-identified] 0800

## 2020-08-07 NOTE — TELEPHONE ENCOUNTER
Maria Isabel Vargas from Hospice called req an order to dose pt's Morphine TID and PRN.     Please fax order to  [phone]

## 2020-08-28 NOTE — DISCHARGE SUMMARY
[address] [id number]                               DISCHARGE SUMMARY    PATIENT NAME: Leonor Perez                      :        1921  MED REC NO:   [id number]                           ROOM:       0020  ACCOUNT NO:   [de-identified]                           ADMIT DATE: 2020  PROVIDER:     Michael Jones M.D.           [facility]: 2020      HISTORY:  This is a 35-year-old female with underlying history of some  Alzheimer's disease, generalized arthritis, peripheral vascular disease,  hypertension and hypothyroidism, who now presents with an unwitnessed  fall resulting in increased pain and discomfort and associated left hip  fracture being present. Apparently, the patient was at the nursing  home, had an unwitnessed fall being present. The patient was found down  after about two hours at the nursing home, unknown mechanism of fall  being present. The patient had oxygen placed, was brought in for  further evaluation. The CT of the head and neck were noted to be  negative. No evidence of any hemorrhagic injury. X-rays of the chest,  pelvis, and left knee were all normal except for a nondisplaced left hip  fracture being present. The patient has a history of GERD,  hypertension, chronic kidney disease, and dementia. Now being admitted  to the trauma service because of the injury being present. PAST MEDICAL HISTORY:  MEDICATIONS:  Include Tylenol, Xanax, Bumex which is for peripheral  edema, Depakote, Aricept, Lexapro, famotidine, gabapentin, Synthroid and  Imodium. ALLERGIES:  She has problems with PREDNISONE, TETANUS, SULFA, SEROQUEL,  PENICILLIN and ASPIRIN. SURGERIES:  Hernia repair, D and C, cholecystectomy and appendectomy. HOSPITALIZATIONS:  For the above had been admitted and admitted for  abdominal pain in addition.   She is also admitted for partial bowel  obstruction because of the large hernia. No recent admissions being  present. ILLNESSES:  Venous stasis insufficiency, peripheral vascular disease,  diffuse osteoarthritis, neuropathy, hypothyroidism, hypertension, GERD,  Alzheimer's disease with some hallucinations. SOCIAL HISTORY:  She has been . Nonsmoker, nonuser of alcohol  products. FAMILY HISTORY:  Unknown about parents. REVIEW OF SYSTEMS:  Constitutionally demonstrates some increased amount  of confusions and symptoms being present. Complains at this point in  time involving the left hip are difficulty with pain, motion, and  movement of the hip at this point. She denies any chest problems. Has  had no shortness of breath. No palpitations. No irregular beats. Stools and urine seem to be stable at this time in addition. She does  have some mild confusion being present. No other joint problems  present. She denies any chest pressure or discomfort being present. Stools regular. Urine without voiding problems. Complains left hip  pain and discomfort being present at this time in addition. PHYSICAL EXAMINATION:  VITAL SIGNS:  At this time blood pressure 125/74, pulse 73, respiratory  rate 20, and pulse ox 100%. HEENT:  Head atraumatic and normocephalic. Eyes, ears, nose, and throat  are clear. NECK:  Supple without adenopathy or increased thyroid. LUNGS:  Clear. CARDIAC:  Regular rate and rhythm. Normal S1 and S2, grade 9-8/2  systolic murmur present. Peripheral pulses 2+. ABDOMEN:  Soft. Positive bowel sounds. Nontender. No masses. EXTREMITIES:  Full range of motion. No CVA tenderness. Weakness  present in the left leg, externally rotated and shorter. NEUROLOGIC:  Pleasantly confused. She is alert. No other significant  findings and problems being present. HOSPITAL COURSE:  Because of the above, the patient was admitted for  further evaluation.   Initial blood work obtained, noted sodium is 141,  potassium 4.7, chloride 98, CO2 of 33, BUN 17, creatinine 0.9. Liver  panel notes slight elevation being present. White count is 6,200,  hemoglobin 9.7, hematocrit 30.5, platelet count 659,430. COVID test was  negative. Hip x-ray noted comminuted displaced intertrochanteric  fracture, left femur. The patient consequently was seen, felt to be  stable for surgery. Dr. Sreedhar Hill did see the patient in consultation for  orthopedics and the patient was taken to surgery actually on 07/22/2020  per Dr. Sreedhar Hill. The patient had appropriate nailing, diagnosis was  femoral neck fracture being present and comminuted. The patient  tolerated the procedure well, did drop down the hemoglobin. Hemoglobin  dropped down to 6.8, did receive one unit of blood and increased up to  8.8 _and_ by 07/25/2020 it was down to 8.2. In general, the patient was  up to the chair, was overall showing improvement. There is a concern  that the patient would decline so appropriate palliative care consult  was done with concerns of possibility of hospice persistent symptom. By  07/25/2020, the patient was stable, had done well and felt could be  discharged to home. FINAL DIAGNOSES:  1. Acute left hip fracture. 2.  Anemia postoperatively secondary to blood loss. 3.  Chronic anxiety. 4.  Dementia. 5.  GERD. 6.  Venous insufficiency. 7.  Hypertension. CONDITION:  Overall stable. DIET:  As tolerated AHA. Blood work obtained in about one week, follow up with Dr. Sreedhar Hill. The  patient has improved with PT and OT and further evaluation along this  line. She was stable for discharge at this point with improvement.         Delilah Gant M.D.    D: 08/27/2020 0:52:00       T: 08/27/2020 4:39:39     CHANDNI/JAVIER  Job#: [id number]     Doc#: [id number]    CC:

## (undated) DEVICE — 1010 S-DRAPE TOWEL DRAPE 10/BX: Brand: STERI-DRAPE™

## (undated) DEVICE — SYRINGE MED 50ML LUERLOCK TIP

## (undated) DEVICE — DRESSING,GAUZE,XEROFORM,CURAD,5"X9",ST: Brand: CURAD

## (undated) DEVICE — DRESSING TRNSPAR W5XL4.5IN FLM SHT SEMIPERMEABLE WIND

## (undated) DEVICE — BANDAGE COBAN 4 IN COMPR W4INXL5YD FOAM COHESIVE QUIK STK SELF ADH SFT

## (undated) DEVICE — 3M™ STERI-DRAPE™ U-DRAPE 1015: Brand: STERI-DRAPE™

## (undated) DEVICE — GUIDE PIN 3.2MM X 343MM: Brand: TRIGEN

## (undated) DEVICE — YANKAUER,BULB TIP,W/O VENT,RIGID,STERILE: Brand: MEDLINE

## (undated) DEVICE — SPONGE GZ W4XL4IN COT 12 PLY TYP VII WVN C FLD DSGN

## (undated) DEVICE — DRAPE C ARM W36XL30IN RECTANG BND BG AND TAPE

## (undated) DEVICE — LINER SUCT CANSTR 1500CC SEMI RIG W/ POR HYDROPHOBIC SHUT

## (undated) DEVICE — SPONGE LAP W18XL18IN WHT COT 4 PLY FLD STRUNG RADPQ DISP ST

## (undated) DEVICE — SYRINGE IRRIG 60ML SFT PLIABLE BLB EZ TO GRP 1 HND USE W/

## (undated) DEVICE — APPLICATOR MEDICATED 26 CC SOLUTION HI LT ORNG CHLORAPREP

## (undated) DEVICE — SOLUTION SCRB 4OZ 4% CHG H2O AIDED FOR PREOPERATIVE SKIN

## (undated) DEVICE — SYSTEM SKIN CLSR 22CM DERMBND PRINEO

## (undated) DEVICE — TUBING, SUCTION, 1/4" X 20', STRAIGHT: Brand: MEDLINE INDUSTRIES, INC.